# Patient Record
Sex: FEMALE | Race: WHITE | NOT HISPANIC OR LATINO | URBAN - METROPOLITAN AREA
[De-identification: names, ages, dates, MRNs, and addresses within clinical notes are randomized per-mention and may not be internally consistent; named-entity substitution may affect disease eponyms.]

---

## 2017-06-16 ENCOUNTER — OUTPATIENT (OUTPATIENT)
Dept: OUTPATIENT SERVICES | Facility: HOSPITAL | Age: 77
LOS: 1 days | End: 2017-06-16

## 2017-08-02 ENCOUNTER — OUTPATIENT (OUTPATIENT)
Dept: OUTPATIENT SERVICES | Facility: HOSPITAL | Age: 77
LOS: 1 days | End: 2017-08-02

## 2017-09-15 ENCOUNTER — OUTPATIENT (OUTPATIENT)
Dept: OUTPATIENT SERVICES | Facility: HOSPITAL | Age: 77
LOS: 1 days | End: 2017-09-15

## 2017-10-23 ENCOUNTER — APPOINTMENT (OUTPATIENT)
Dept: ORTHOPEDIC SURGERY | Facility: CLINIC | Age: 77
End: 2017-10-23
Payer: MEDICARE

## 2017-10-23 VITALS
HEART RATE: 58 BPM | DIASTOLIC BLOOD PRESSURE: 73 MMHG | BODY MASS INDEX: 32.49 KG/M2 | SYSTOLIC BLOOD PRESSURE: 128 MMHG | WEIGHT: 195 LBS | HEIGHT: 65 IN

## 2017-10-23 DIAGNOSIS — Z87.39 PERSONAL HISTORY OF OTHER DISEASES OF THE MUSCULOSKELETAL SYSTEM AND CONNECTIVE TISSUE: ICD-10-CM

## 2017-10-23 DIAGNOSIS — M19.042 PRIMARY OSTEOARTHRITIS, LEFT HAND: ICD-10-CM

## 2017-10-23 DIAGNOSIS — M19.041 PRIMARY OSTEOARTHRITIS, RIGHT HAND: ICD-10-CM

## 2017-10-23 DIAGNOSIS — Z82.61 FAMILY HISTORY OF ARTHRITIS: ICD-10-CM

## 2017-10-23 DIAGNOSIS — Z82.62 FAMILY HISTORY OF OSTEOPOROSIS: ICD-10-CM

## 2017-10-23 DIAGNOSIS — Z86.79 PERSONAL HISTORY OF OTHER DISEASES OF THE CIRCULATORY SYSTEM: ICD-10-CM

## 2017-10-23 PROCEDURE — 99203 OFFICE O/P NEW LOW 30 MIN: CPT

## 2017-10-23 PROCEDURE — 73130 X-RAY EXAM OF HAND: CPT | Mod: 26,LT

## 2017-10-23 RX ORDER — TRAZODONE HYDROCHLORIDE 50 MG/1
50 TABLET ORAL
Refills: 0 | Status: ACTIVE | COMMUNITY

## 2017-10-23 RX ORDER — ALPRAZOLAM 0.25 MG/1
0.25 TABLET ORAL
Refills: 0 | Status: ACTIVE | COMMUNITY

## 2017-10-23 RX ORDER — ASPIRIN 81 MG/1
81 TABLET, CHEWABLE ORAL
Refills: 0 | Status: ACTIVE | COMMUNITY

## 2017-10-23 RX ORDER — PROPRANOLOL HYDROCHLORIDE 40 MG/1
40 TABLET ORAL
Refills: 0 | Status: ACTIVE | COMMUNITY

## 2017-12-12 ENCOUNTER — OUTPATIENT (OUTPATIENT)
Dept: OUTPATIENT SERVICES | Facility: HOSPITAL | Age: 77
LOS: 1 days | End: 2017-12-12

## 2018-02-28 ENCOUNTER — OUTPATIENT (OUTPATIENT)
Dept: OUTPATIENT SERVICES | Facility: HOSPITAL | Age: 78
LOS: 1 days | End: 2018-02-28

## 2018-03-02 ENCOUNTER — MEDICATION RENEWAL (OUTPATIENT)
Age: 78
End: 2018-03-02

## 2018-03-02 ENCOUNTER — APPOINTMENT (OUTPATIENT)
Dept: UROLOGY | Facility: CLINIC | Age: 78
End: 2018-03-02
Payer: MEDICARE

## 2018-03-02 VITALS — DIASTOLIC BLOOD PRESSURE: 72 MMHG | TEMPERATURE: 97.9 F | HEART RATE: 56 BPM | SYSTOLIC BLOOD PRESSURE: 114 MMHG

## 2018-03-02 PROCEDURE — 99214 OFFICE O/P EST MOD 30 MIN: CPT

## 2018-03-10 ENCOUNTER — APPOINTMENT (OUTPATIENT)
Dept: UROLOGY | Facility: CLINIC | Age: 78
End: 2018-03-10

## 2018-03-27 ENCOUNTER — TRANSCRIPTION ENCOUNTER (OUTPATIENT)
Age: 78
End: 2018-03-27

## 2018-04-18 ENCOUNTER — OUTPATIENT (OUTPATIENT)
Dept: OUTPATIENT SERVICES | Facility: HOSPITAL | Age: 78
LOS: 1 days | End: 2018-04-18

## 2019-01-23 ENCOUNTER — OUTPATIENT (OUTPATIENT)
Dept: OUTPATIENT SERVICES | Facility: HOSPITAL | Age: 79
LOS: 1 days | End: 2019-01-23

## 2019-04-15 ENCOUNTER — OUTPATIENT (OUTPATIENT)
Dept: OUTPATIENT SERVICES | Facility: HOSPITAL | Age: 79
LOS: 1 days | End: 2019-04-15

## 2019-06-11 ENCOUNTER — OUTPATIENT (OUTPATIENT)
Dept: OUTPATIENT SERVICES | Facility: HOSPITAL | Age: 79
LOS: 1 days | End: 2019-06-11

## 2019-07-12 ENCOUNTER — APPOINTMENT (OUTPATIENT)
Dept: UROGYNECOLOGY | Facility: CLINIC | Age: 79
End: 2019-07-12
Payer: MEDICARE

## 2019-07-12 VITALS
DIASTOLIC BLOOD PRESSURE: 70 MMHG | SYSTOLIC BLOOD PRESSURE: 128 MMHG | HEIGHT: 65 IN | BODY MASS INDEX: 31.32 KG/M2 | WEIGHT: 188 LBS

## 2019-07-12 DIAGNOSIS — E07.9 DISORDER OF THYROID, UNSPECIFIED: ICD-10-CM

## 2019-07-12 DIAGNOSIS — K59.00 CONSTIPATION, UNSPECIFIED: ICD-10-CM

## 2019-07-12 DIAGNOSIS — Z86.59 PERSONAL HISTORY OF OTHER MENTAL AND BEHAVIORAL DISORDERS: ICD-10-CM

## 2019-07-12 DIAGNOSIS — Z83.3 FAMILY HISTORY OF DIABETES MELLITUS: ICD-10-CM

## 2019-07-12 DIAGNOSIS — Z82.49 FAMILY HISTORY OF ISCHEMIC HEART DISEASE AND OTHER DISEASES OF THE CIRCULATORY SYSTEM: ICD-10-CM

## 2019-07-12 DIAGNOSIS — Z86.711 PERSONAL HISTORY OF PULMONARY EMBOLISM: ICD-10-CM

## 2019-07-12 DIAGNOSIS — Z86.718 PERSONAL HISTORY OF OTHER VENOUS THROMBOSIS AND EMBOLISM: ICD-10-CM

## 2019-07-12 DIAGNOSIS — Z82.5 FAMILY HISTORY OF ASTHMA AND OTHER CHRONIC LOWER RESPIRATORY DISEASES: ICD-10-CM

## 2019-07-12 LAB
BILIRUB UR QL STRIP: NEGATIVE
CLARITY UR: CLEAR
COLLECTION METHOD: NORMAL
GLUCOSE UR-MCNC: NEGATIVE
HCG UR QL: 0.2 EU/DL
HGB UR QL STRIP.AUTO: NORMAL
KETONES UR-MCNC: NEGATIVE
LEUKOCYTE ESTERASE UR QL STRIP: NORMAL
NITRITE UR QL STRIP: NEGATIVE
PH UR STRIP: 6
PROT UR STRIP-MCNC: NEGATIVE
SP GR UR STRIP: 1.01

## 2019-07-12 PROCEDURE — 51701 INSERT BLADDER CATHETER: CPT

## 2019-07-12 PROCEDURE — 81003 URINALYSIS AUTO W/O SCOPE: CPT | Mod: QW

## 2019-07-12 PROCEDURE — 99204 OFFICE O/P NEW MOD 45 MIN: CPT | Mod: 25

## 2019-07-12 RX ORDER — ESTRADIOL 0.1 MG/G
0.1 CREAM VAGINAL
Qty: 1 | Refills: 2 | Status: DISCONTINUED | COMMUNITY
Start: 2018-03-02 | End: 2019-07-12

## 2019-07-12 NOTE — HISTORY OF PRESENT ILLNESS
[FreeTextEntry1] : \par 77 y/o presents s/p uphold and TOT sling by Dr. Clark 11/2018, she had to stay overnight and catheter removed pod#1. She reports minor bulge prior to surgery. reports was on tylenol with codeine for 3 wks,   and reports UTIs and persistent OAB  since surgery, was told she needs to be on antibiotic for rest of her life, she reports UTIs and FAHEEM prior to surgery, she had a reaction to bactrim during treatment. She reports many years of UTIs and FAHEEM, she is a retired nurse. she reports leakage of stool that also she noticed after surgery, she reports fecal urgency, she reports currently her most bothersome symptoms is UTI, reports 1 UTI a month, reports does not get urine checked anytime, has not tried estrace or dmannose, does not take cranberry tablets, does not take Azo, does not feel like she as an infection today, reports symptoms include pain and odor, reports daytime frequency at least every 2 hours, nocturia X 3, minimal leakage, denies hematuria, denies vaginal bleeding, reports incomplete emptying, occasional intermittent and slow stream, denies constipations, not sexually active, has tried myrbetriq with no improvement\par \par daily intake: 10-12 glasses/day, drinks a lot tea, \par \par she reports need to [facility] after knee surgery 10 yrs ago\par PSH: vaginal hysterectomy for prolapse

## 2019-07-12 NOTE — OB HISTORY
[Vaginal ___] : [unfilled] vaginal delivery(s) [ ___] : [unfilled]  section delivery(s) [Regular Cycle Intervals] : periods have been irregular [Sexually Active] : is not sexually active

## 2019-07-12 NOTE — DISCUSSION/SUMMARY
[FreeTextEntry1] : \par Cynthia presents s/p uphold and TOT sling in 11/2018 and presents with c/o urgency, frequency and most bothersome recurrent UTI. On exam no mesh exposure, no prolapse,  normal PVR. She does not have culture results available. We discussed etiology and management of recurrent urinary tract infections. We discussed behavioral modifications including increased oral intake, cranberry tablets, wiping front to back, d mannose. We discussed management of recurrent UTIs with vaginal estrogen and antibiotic prophylaxis. She does is not interested in vaginal estrogen and discussed nonhormonal vaginal lubricants.   We discussed that is she is symptomatic I prefer for catheterized specimen however if she cannot come to the office, discussed u/a and culture prior to antibiotic treatment. We discussed methenamine ppx with vit C. We discussed cystoscopy due to h/o of mesh. Regarding overactive bladder, We reviewed her symptoms and exam findings. We discussed management options for overactive bladder including observation, behavorial modifications and bladder training, physical therapy and medications including anticholinergics and beta 3 agonists. We discussed if behavorial modifications and medications fail proceeding with urodynamics to further evaluate her symptoms. We discussed additional treatment options including sacral neuromodulation, PTNS and intra detrusor Botox. IUGA handout on overactive bladder and bladder training was given to her. We discussed trial of PTNS given failed myrbetriq before and not interested in anticholingerics due to risk of dementia. All questions were answered.\par \par [] u/a, culture\par [] cystoscopy\par [] methenamine ppx\par [] PTNS/BT\par \par \par

## 2019-07-12 NOTE — PHYSICAL EXAM
[Well developed] : well developed [No Acute Distress] : in no acute distress [Well Nourished] : ~L well nourished [Normal Memory] : ~T memory was ~L unimpaired [Oriented x3] : oriented to person, place, and time [Normal Mood/Affect] : mood and affect are normal [Cough] : no cough [Tenderness] : ~T no ~M abdominal tenderness observed [No Edema] : ~T edema was not present [Inguinal LAD] : no adenopathy was noted in the inguinal lymph nodes [Distended] : not distended [Normal Gait] : gait was normal [Warm and Dry] : was warm and dry to touch [Labia Majora] : were normal [Labia Minora] : were normal [Normal Appearance] : general appearance was normal [Normal] : was normal [3] : 3 [Absent] : absent [Soft] :  the cervix was soft [Post Void Residual ____ml] : post void residual via catheterization was [unfilled] ml [de-identified] : no prolapse noted [FreeTextEntry4] : no mesh exposure, palpable banding and TOT sling palpable

## 2019-07-15 ENCOUNTER — RESULT REVIEW (OUTPATIENT)
Age: 79
End: 2019-07-15

## 2019-07-15 LAB
APPEARANCE: CLEAR
BACTERIA: NEGATIVE
BILIRUBIN URINE: NEGATIVE
BLOOD URINE: NEGATIVE
COLOR: NORMAL
GLUCOSE QUALITATIVE U: NEGATIVE
HYALINE CASTS: 0 /LPF
KETONES URINE: NEGATIVE
LEUKOCYTE ESTERASE URINE: NEGATIVE
MICROSCOPIC-UA: NORMAL
NITRITE URINE: NEGATIVE
PH URINE: 6.5
PROTEIN URINE: NEGATIVE
RED BLOOD CELLS URINE: 0 /HPF
SPECIFIC GRAVITY URINE: 1.01
SQUAMOUS EPITHELIAL CELLS: 0 /HPF
UROBILINOGEN URINE: NORMAL
WHITE BLOOD CELLS URINE: 1 /HPF

## 2019-07-16 ENCOUNTER — TRANSCRIPTION ENCOUNTER (OUTPATIENT)
Age: 79
End: 2019-07-16

## 2019-07-16 LAB — BACTERIA UR CULT: ABNORMAL

## 2019-07-22 ENCOUNTER — RESULT CHARGE (OUTPATIENT)
Age: 79
End: 2019-07-22

## 2019-07-22 ENCOUNTER — APPOINTMENT (OUTPATIENT)
Dept: UROGYNECOLOGY | Facility: CLINIC | Age: 79
End: 2019-07-22
Payer: MEDICARE

## 2019-07-22 VITALS
BODY MASS INDEX: 31.16 KG/M2 | WEIGHT: 187 LBS | HEIGHT: 65 IN | DIASTOLIC BLOOD PRESSURE: 70 MMHG | SYSTOLIC BLOOD PRESSURE: 120 MMHG

## 2019-07-22 LAB
BILIRUB UR QL STRIP: NEGATIVE
CLARITY UR: CLEAR
COLLECTION METHOD: NORMAL
GLUCOSE UR-MCNC: NEGATIVE
HCG UR QL: 0.2 EU/DL
HGB UR QL STRIP.AUTO: NORMAL
KETONES UR-MCNC: NEGATIVE
LEUKOCYTE ESTERASE UR QL STRIP: NEGATIVE
NITRITE UR QL STRIP: NEGATIVE
PH UR STRIP: 5
PROT UR STRIP-MCNC: NEGATIVE
SP GR UR STRIP: 1.02

## 2019-07-22 PROCEDURE — 81003 URINALYSIS AUTO W/O SCOPE: CPT | Mod: QW

## 2019-07-22 PROCEDURE — 52000 CYSTOURETHROSCOPY: CPT

## 2019-07-22 PROCEDURE — 99213 OFFICE O/P EST LOW 20 MIN: CPT | Mod: 25

## 2019-07-22 RX ORDER — METHENAMINE HIPPURATE 1 G/1
1 TABLET ORAL TWICE DAILY
Qty: 180 | Refills: 3 | Status: DISCONTINUED | COMMUNITY
Start: 2019-07-12 | End: 2019-07-22

## 2019-07-22 NOTE — HISTORY OF PRESENT ILLNESS
[FreeTextEntry1] : \par Cynthia presents for followup on OAB and recurrent UTI, she did not start PTNS because she is watching her grandson. She took course of keflex. she was unable to get methenamine due to cost.\par \par cystoscopy today negative\par \par s/p uphold and TOT sling 11/2018

## 2019-07-22 NOTE — DISCUSSION/SUMMARY
[FreeTextEntry1] : \par Cynthia presents for followup on OAB and recurrent UTI. She has no symptoms of UTI today, cystoscopy negative. She was unable to get methenamine, keflex given for ppx, advised to take probiotics. Renal us ordered for recurrent uti. She will return for PTNS. All questions \par \par [] keflex UTI ppx\par [] renal us

## 2019-07-23 ENCOUNTER — RESULT REVIEW (OUTPATIENT)
Age: 79
End: 2019-07-23

## 2019-07-23 LAB
APPEARANCE: CLEAR
BACTERIA: NEGATIVE
BILIRUBIN URINE: NEGATIVE
BLOOD URINE: NORMAL
COLOR: YELLOW
GLUCOSE QUALITATIVE U: NEGATIVE
HYALINE CASTS: 1 /LPF
KETONES URINE: NEGATIVE
LEUKOCYTE ESTERASE URINE: NEGATIVE
MICROSCOPIC-UA: NORMAL
NITRITE URINE: NEGATIVE
PH URINE: 5.5
PROTEIN URINE: NORMAL
RED BLOOD CELLS URINE: 2 /HPF
SPECIFIC GRAVITY URINE: 1.02
SQUAMOUS EPITHELIAL CELLS: 1 /HPF
UROBILINOGEN URINE: NORMAL
WHITE BLOOD CELLS URINE: 0 /HPF

## 2019-07-24 ENCOUNTER — RESULT REVIEW (OUTPATIENT)
Age: 79
End: 2019-07-24

## 2019-07-25 ENCOUNTER — RESULT REVIEW (OUTPATIENT)
Age: 79
End: 2019-07-25

## 2019-07-25 LAB — BACTERIA UR CULT: NORMAL

## 2019-07-31 ENCOUNTER — APPOINTMENT (OUTPATIENT)
Dept: UROGYNECOLOGY | Facility: CLINIC | Age: 79
End: 2019-07-31

## 2019-08-07 ENCOUNTER — OUTPATIENT (OUTPATIENT)
Dept: OUTPATIENT SERVICES | Facility: HOSPITAL | Age: 79
LOS: 1 days | End: 2019-08-07
Payer: MEDICARE

## 2019-08-07 PROCEDURE — 76770 US EXAM ABDO BACK WALL COMP: CPT | Mod: 26

## 2019-08-07 PROCEDURE — 76856 US EXAM PELVIC COMPLETE: CPT | Mod: 26

## 2019-09-16 ENCOUNTER — OUTPATIENT (OUTPATIENT)
Dept: OUTPATIENT SERVICES | Facility: HOSPITAL | Age: 79
LOS: 1 days | End: 2019-09-16

## 2019-11-14 ENCOUNTER — OUTPATIENT (OUTPATIENT)
Dept: OUTPATIENT SERVICES | Facility: HOSPITAL | Age: 79
LOS: 1 days | End: 2019-11-14

## 2019-12-17 ENCOUNTER — OUTPATIENT (OUTPATIENT)
Dept: OUTPATIENT SERVICES | Facility: HOSPITAL | Age: 79
LOS: 1 days | End: 2019-12-17

## 2020-02-24 ENCOUNTER — APPOINTMENT (OUTPATIENT)
Age: 80
End: 2020-02-24
Payer: MEDICARE

## 2020-02-24 VITALS — TEMPERATURE: 97.7 F | DIASTOLIC BLOOD PRESSURE: 58 MMHG | SYSTOLIC BLOOD PRESSURE: 102 MMHG

## 2020-02-24 DIAGNOSIS — R15.9 FULL INCONTINENCE OF FECES: ICD-10-CM

## 2020-02-24 PROCEDURE — 99214 OFFICE O/P EST MOD 30 MIN: CPT

## 2020-02-24 NOTE — PHYSICAL EXAM
[Normal] : no abnormalities [Post Void Residual ____ml] : post void residual via catheterization was [unfilled] ml

## 2020-02-24 NOTE — HISTORY OF PRESENT ILLNESS
[FreeTextEntry1] : \par Cynthia presents for followup on OAB and recurrent UTI, currently OAB symptoms not bothersome. Regarding UTIs, she reports 1 UTI since july, she stopped keflex ppx 1 month ago and reports burning with urination and odor, estrogen is too expensive, she does report following with GI regarding FI.\par \par \par \par s/p uphold and TOT sling 11/2018, cystoscopy negative, renal sonogram with cyst

## 2020-02-24 NOTE — DISCUSSION/SUMMARY
[FreeTextEntry1] : \par Cynthia presents for followup on  recurrent UTI, OAB not bothersome. Today feels like she has a UTI, macrobid sent empirically, u/a and culture sent. F/u with GI re: FI.\par \par [] keflex UTI ppx\par []  f/u 6 months or sooner

## 2020-02-25 LAB
APPEARANCE: CLEAR
BACTERIA: ABNORMAL
BILIRUBIN URINE: NEGATIVE
BLOOD URINE: NEGATIVE
COLOR: COLORLESS
GLUCOSE QUALITATIVE U: NEGATIVE
HYALINE CASTS: 0 /LPF
KETONES URINE: NEGATIVE
LEUKOCYTE ESTERASE URINE: ABNORMAL
MICROSCOPIC-UA: NORMAL
NITRITE URINE: POSITIVE
PH URINE: 6
PROTEIN URINE: NEGATIVE
RED BLOOD CELLS URINE: 1 /HPF
SPECIFIC GRAVITY URINE: 1.01
SQUAMOUS EPITHELIAL CELLS: 2 /HPF
UROBILINOGEN URINE: NORMAL
WHITE BLOOD CELLS URINE: 3 /HPF

## 2020-02-27 LAB — BACTERIA UR CULT: ABNORMAL

## 2020-03-03 ENCOUNTER — OUTPATIENT (OUTPATIENT)
Dept: OUTPATIENT SERVICES | Facility: HOSPITAL | Age: 80
LOS: 1 days | End: 2020-03-03

## 2020-05-27 ENCOUNTER — RX RENEWAL (OUTPATIENT)
Age: 80
End: 2020-05-27

## 2020-06-04 ENCOUNTER — OUTPATIENT (OUTPATIENT)
Dept: OUTPATIENT SERVICES | Facility: HOSPITAL | Age: 80
LOS: 1 days | End: 2020-06-04

## 2020-06-29 ENCOUNTER — RX RENEWAL (OUTPATIENT)
Age: 80
End: 2020-06-29

## 2020-08-03 ENCOUNTER — RX RENEWAL (OUTPATIENT)
Age: 80
End: 2020-08-03

## 2020-08-07 ENCOUNTER — APPOINTMENT (OUTPATIENT)
Age: 80
End: 2020-08-07

## 2020-08-23 ENCOUNTER — TRANSCRIPTION ENCOUNTER (OUTPATIENT)
Age: 80
End: 2020-08-23

## 2020-09-03 RX ORDER — CEPHALEXIN 500 MG/1
500 TABLET ORAL TWICE DAILY
Qty: 10 | Refills: 0 | Status: DISCONTINUED | COMMUNITY
Start: 2020-09-03 | End: 2020-09-03

## 2020-09-11 ENCOUNTER — APPOINTMENT (OUTPATIENT)
Age: 80
End: 2020-09-11
Payer: MEDICARE

## 2020-09-11 ENCOUNTER — RESULT CHARGE (OUTPATIENT)
Age: 80
End: 2020-09-11

## 2020-09-11 VITALS — DIASTOLIC BLOOD PRESSURE: 80 MMHG | SYSTOLIC BLOOD PRESSURE: 120 MMHG

## 2020-09-11 DIAGNOSIS — R33.9 RETENTION OF URINE, UNSPECIFIED: ICD-10-CM

## 2020-09-11 LAB
BILIRUB UR QL STRIP: NEGATIVE
CLARITY UR: CLEAR
COLLECTION METHOD: NORMAL
GLUCOSE UR-MCNC: NEGATIVE
HCG UR QL: 0.2 EU/DL
HGB UR QL STRIP.AUTO: NORMAL
KETONES UR-MCNC: NEGATIVE
LEUKOCYTE ESTERASE UR QL STRIP: NORMAL
NITRITE UR QL STRIP: NEGATIVE
PH UR STRIP: 6
PROT UR STRIP-MCNC: NEGATIVE
SP GR UR STRIP: 1

## 2020-09-11 PROCEDURE — 51701 INSERT BLADDER CATHETER: CPT

## 2020-09-11 PROCEDURE — 99214 OFFICE O/P EST MOD 30 MIN: CPT | Mod: 25

## 2020-09-11 NOTE — PHYSICAL EXAM
[No Acute Distress] : in no acute distress [Well developed] : well developed [Well Nourished] : ~L well nourished [Oriented x3] : oriented to person, place, and time [Normal Memory] : ~T memory was ~L unimpaired [Cough] : no cough [Normal Mood/Affect] : mood and affect are normal [H/Smegaly] : no hepatosplenomegaly [Distended] : not distended [Warm and Dry] : was warm and dry to touch [Inguinal LAD] : no adenopathy was noted in the inguinal lymph nodes [Normal Gait] : gait was normal [Labia Majora] : were normal [Labia Minora] : were normal [Normal Appearance] : general appearance was normal [Normal] : no abnormalities [Post Void Residual ____ml] : post void residual was [unfilled] ml

## 2020-09-11 NOTE — DISCUSSION/SUMMARY
[FreeTextEntry1] : \par Cynthia presents for followup on  recurrent UTI, OAB not bothersome. Today with UTI despite treatment with macrobid and keflex, discussed treating with cipro and restarting keflex ppx. all questions were answered. \par \par [] cipro for UTI treatment (u/a and culture sent)\par [] keflex UTI ppx\par []  f/u 6 months or sooner

## 2020-09-11 NOTE — HISTORY OF PRESENT ILLNESS
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 Regarding UTIs, she reports 1 UTI since seen last however she was seen in urgent care with klebsiella UTI starting on macrobid switched to keflex  and continues to have no relief in symptoms, reports frequency/urgency/dysuria. she ran out of keflex so stopped taking it ppx\par \par \par s/p uphold and TOT sling 11/2018, cystoscopy negative, renal sonogram with cyst

## 2020-09-14 LAB
APPEARANCE: CLEAR
BACTERIA UR CULT: ABNORMAL
BACTERIA: NEGATIVE
BILIRUBIN URINE: NEGATIVE
BLOOD URINE: NEGATIVE
COLOR: COLORLESS
GLUCOSE QUALITATIVE U: NEGATIVE
HYALINE CASTS: 0 /LPF
KETONES URINE: NEGATIVE
LEUKOCYTE ESTERASE URINE: ABNORMAL
MICROSCOPIC-UA: NORMAL
NITRITE URINE: NEGATIVE
PH URINE: 7
PROTEIN URINE: NEGATIVE
RED BLOOD CELLS URINE: 0 /HPF
SPECIFIC GRAVITY URINE: 1
SQUAMOUS EPITHELIAL CELLS: 0 /HPF
UROBILINOGEN URINE: NORMAL
WHITE BLOOD CELLS URINE: 19 /HPF

## 2020-09-15 ENCOUNTER — OUTPATIENT (OUTPATIENT)
Dept: OUTPATIENT SERVICES | Facility: HOSPITAL | Age: 80
LOS: 1 days | End: 2020-09-15

## 2020-09-21 ENCOUNTER — RESULT CHARGE (OUTPATIENT)
Age: 80
End: 2020-09-21

## 2020-09-21 ENCOUNTER — APPOINTMENT (OUTPATIENT)
Age: 80
End: 2020-09-21
Payer: MEDICARE

## 2020-09-21 VITALS — DIASTOLIC BLOOD PRESSURE: 70 MMHG | SYSTOLIC BLOOD PRESSURE: 138 MMHG

## 2020-09-21 LAB
BILIRUB UR QL STRIP: NEGATIVE
CLARITY UR: NORMAL
COLLECTION METHOD: NORMAL
GLUCOSE UR-MCNC: NEGATIVE
HCG UR QL: 0.2 EU/DL
HGB UR QL STRIP.AUTO: NORMAL
KETONES UR-MCNC: NEGATIVE
LEUKOCYTE ESTERASE UR QL STRIP: NORMAL
NITRITE UR QL STRIP: POSITIVE
PH UR STRIP: 5.5
PROT UR STRIP-MCNC: NEGATIVE
SP GR UR STRIP: 1.02

## 2020-09-21 PROCEDURE — 99214 OFFICE O/P EST MOD 30 MIN: CPT

## 2020-09-21 NOTE — DISCUSSION/SUMMARY
[FreeTextEntry1] : \par Cynthia presents for followup on  recurrent UTI, OAB not bothersome. Today with UTI despite treatment with macrobid and keflex and cipro, discussed renal sonogram, treatment with cefuroxime 500mg BID\par \par [] cefuroxime for UTI treatment (u/a and culture sent)\par [] keflex UTI ppx after UTI treatment \par [] renal/bladder  sonogram \par []  f/u 10 days for test of cure

## 2020-09-21 NOTE — HISTORY OF PRESENT ILLNESS
[Cystocele (Obstetric)] : mild [Vaginal Wall Prolapse] : no [Rectal Prolapse] : no [Unable To Restrain Bowel Movement] : mild [Feelings Of Urinary Urgency] : moderate [x3+] : nocturia three or more  times a night [Incomplete Emptying Of Bladder] : no [Urinary Frequency] : no [Pain During Urination (Dysuria)] : moderate [Urinary Tract Infection] : moderate [Constipation Obstructed Defecation] : mild [] : no [Incomplete Emptying Of Stool] : no [Pelvic Pain] : mild [Vaginal Pain] : no [FreeTextEntry1] : \par Cynthia presents for followup on OAB and recurrent UTI, currently OAB symptoms not bothersome. Regarding UTIs, she reports 1 UTI since seen last however she was seen in urgent care with klebsiella UTI starting on macrobid switched to keflex  and continued to have no relief in symptoms, was given cipro last visit and urine cx with >100k gram neg rods, she continues to not have a response to medication, advised to get renal sonogram but has not yet, reports continued dysuria, frequency, no fevers/chills, no nausea/emesis \par \par \par s/p uphold and TOT sling 11/2018, cystoscopy negative, renal sonogram with cyst

## 2020-09-21 NOTE — PHYSICAL EXAM
[Chaperone Present] : A chaperone was present in the examining room during all aspects of the physical examination [No Acute Distress] : in no acute distress [Well developed] : well developed [Well Nourished] : ~L well nourished [Oriented x3] : oriented to person, place, and time [Normal Memory] : ~T memory was ~L unimpaired [Normal Mood/Affect] : mood and affect are normal [Cough] : no cough [Distended] : not distended [H/Smegaly] : no hepatosplenomegaly [None] : no CVA tenderness [Inguinal LAD] : no adenopathy was noted in the inguinal lymph nodes [Warm and Dry] : was warm and dry to touch [Normal Gait] : gait was normal [Labia Majora] : were normal [Labia Minora] : were normal [Normal Appearance] : general appearance was normal [Normal] : no abnormalities [Post Void Residual ____ml] : post void residual was [unfilled] ml

## 2020-09-22 ENCOUNTER — OUTPATIENT (OUTPATIENT)
Dept: OUTPATIENT SERVICES | Facility: HOSPITAL | Age: 80
LOS: 1 days | End: 2020-09-22
Payer: MEDICARE

## 2020-09-22 LAB
APPEARANCE: CLEAR
BACTERIA: ABNORMAL
BILIRUBIN URINE: NEGATIVE
BLOOD URINE: NEGATIVE
COLOR: YELLOW
GLUCOSE QUALITATIVE U: NEGATIVE
HYALINE CASTS: 1 /LPF
KETONES URINE: NEGATIVE
LEUKOCYTE ESTERASE URINE: ABNORMAL
MICROSCOPIC-UA: NORMAL
NITRITE URINE: NEGATIVE
PH URINE: 6
PROTEIN URINE: NORMAL
RED BLOOD CELLS URINE: 2 /HPF
SPECIFIC GRAVITY URINE: 1.02
SQUAMOUS EPITHELIAL CELLS: 0 /HPF
UROBILINOGEN URINE: NORMAL
WHITE BLOOD CELLS URINE: 57 /HPF

## 2020-09-22 PROCEDURE — 76856 US EXAM PELVIC COMPLETE: CPT | Mod: 26

## 2020-09-22 PROCEDURE — 76770 US EXAM ABDO BACK WALL COMP: CPT | Mod: 26

## 2020-09-24 LAB — BACTERIA UR CULT: ABNORMAL

## 2020-10-02 ENCOUNTER — RESULT CHARGE (OUTPATIENT)
Age: 80
End: 2020-10-02

## 2020-10-02 ENCOUNTER — APPOINTMENT (OUTPATIENT)
Age: 80
End: 2020-10-02
Payer: MEDICARE

## 2020-10-02 LAB
BILIRUB UR QL STRIP: NEGATIVE
CLARITY UR: CLEAR
COLLECTION METHOD: NORMAL
GLUCOSE UR-MCNC: NEGATIVE
HCG UR QL: 0.2 EU/DL
HGB UR QL STRIP.AUTO: NORMAL
KETONES UR-MCNC: NEGATIVE
LEUKOCYTE ESTERASE UR QL STRIP: NEGATIVE
NITRITE UR QL STRIP: NEGATIVE
PH UR STRIP: 6
SP GR UR STRIP: 1.01

## 2020-10-02 PROCEDURE — 99213 OFFICE O/P EST LOW 20 MIN: CPT

## 2020-10-02 NOTE — HISTORY OF PRESENT ILLNESS
[Cystocele (Obstetric)] : mild [Vaginal Wall Prolapse] : no [Rectal Prolapse] : no [Unable To Restrain Bowel Movement] : mild [Feelings Of Urinary Urgency] : moderate [x3+] : nocturia three or more  times a night [Incomplete Emptying Of Bladder] : no [Urinary Frequency] : no [Pain During Urination (Dysuria)] : no [Urinary Tract Infection] : moderate [Constipation Obstructed Defecation] : mild [] : no [Incomplete Emptying Of Stool] : no [Pelvic Pain] : mild [Vaginal Pain] : no [FreeTextEntry1] : \par Cynthia presents for followup on OAB and recurrent UTI, currently OAB symptoms not bothersome. Regarding UTIs, she reports 1 UTI s however she was seen in urgent care with klebsiella UTI starting on macrobid switched to keflex  and continued to have no relief in symptoms, was given cipro  and urine cx with >100k gram neg rods, she continued to have no relief so ceftin was given, she now has resolution of her symptoms, dysuria resolved and is much happier. \par \par \par s/p uphold and TOT sling 11/2018, cystoscopy negative, \par recent sonogram with no stone/hydro

## 2020-10-02 NOTE — PHYSICAL EXAM
[Chaperone Present] : A chaperone was present in the examining room during all aspects of the physical examination [No Acute Distress] : in no acute distress [Well developed] : well developed [Well Nourished] : ~L well nourished [Oriented x3] : oriented to person, place, and time [Normal Memory] : ~T memory was ~L unimpaired [Normal Mood/Affect] : mood and affect are normal [Cough] : no cough [Inguinal LAD] : no adenopathy was noted in the inguinal lymph nodes [Warm and Dry] : was warm and dry to touch [Normal Gait] : gait was normal [Labia Majora] : were normal [Labia Minora] : were normal [Normal Appearance] : general appearance was normal [Normal] : no abnormalities [Post Void Residual ____ml] : post void residual was [unfilled] ml

## 2020-10-02 NOTE — DISCUSSION/SUMMARY
[FreeTextEntry1] : \par Cynthia presents for followup on  recurrent UTI, OAB not bothersome. UTI now resolved with symptoms resolved and negative renal sonogram. will send u/a and culture to ensure no growth and restart keflex ppx. All questions were answered. \par \par [] keflex UTI ppx \par []  f/u 6  months

## 2020-10-05 LAB
APPEARANCE: CLEAR
BACTERIA: NEGATIVE
BILIRUBIN URINE: NEGATIVE
BLOOD URINE: NEGATIVE
COLOR: NORMAL
GLUCOSE QUALITATIVE U: NEGATIVE
HYALINE CASTS: 0 /LPF
KETONES URINE: NEGATIVE
LEUKOCYTE ESTERASE URINE: NEGATIVE
MICROSCOPIC-UA: NORMAL
NITRITE URINE: NEGATIVE
PH URINE: 6
PROTEIN URINE: NEGATIVE
RED BLOOD CELLS URINE: 1 /HPF
SPECIFIC GRAVITY URINE: 1.01
SQUAMOUS EPITHELIAL CELLS: 0 /HPF
UROBILINOGEN URINE: NORMAL
WHITE BLOOD CELLS URINE: 0 /HPF

## 2020-10-27 ENCOUNTER — OUTPATIENT (OUTPATIENT)
Dept: OUTPATIENT SERVICES | Facility: HOSPITAL | Age: 80
LOS: 1 days | End: 2020-10-27
Payer: MEDICARE

## 2020-10-27 PROCEDURE — 73630 X-RAY EXAM OF FOOT: CPT | Mod: 26,LT,76

## 2020-10-27 PROCEDURE — 73610 X-RAY EXAM OF ANKLE: CPT | Mod: 26,RT

## 2020-10-27 PROCEDURE — 73130 X-RAY EXAM OF HAND: CPT | Mod: 26,LT,76

## 2020-10-27 PROCEDURE — 73110 X-RAY EXAM OF WRIST: CPT | Mod: 26,RT

## 2020-12-22 ENCOUNTER — OUTPATIENT (OUTPATIENT)
Dept: OUTPATIENT SERVICES | Facility: HOSPITAL | Age: 80
LOS: 1 days | End: 2020-12-22

## 2021-03-25 ENCOUNTER — OUTPATIENT (OUTPATIENT)
Dept: OUTPATIENT SERVICES | Facility: HOSPITAL | Age: 81
LOS: 1 days | End: 2021-03-25

## 2021-03-29 ENCOUNTER — RX RENEWAL (OUTPATIENT)
Age: 81
End: 2021-03-29

## 2021-04-02 ENCOUNTER — APPOINTMENT (OUTPATIENT)
Age: 81
End: 2021-04-02
Payer: MEDICARE

## 2021-04-02 ENCOUNTER — RESULT CHARGE (OUTPATIENT)
Age: 81
End: 2021-04-02

## 2021-04-02 VITALS — TEMPERATURE: 98 F | SYSTOLIC BLOOD PRESSURE: 122 MMHG | DIASTOLIC BLOOD PRESSURE: 80 MMHG

## 2021-04-02 LAB
BILIRUB UR QL STRIP: NEGATIVE
CLARITY UR: CLEAR
COLLECTION METHOD: NORMAL
GLUCOSE UR-MCNC: NEGATIVE
HCG UR QL: 0.2 EU/DL
HGB UR QL STRIP.AUTO: NORMAL
KETONES UR-MCNC: NEGATIVE
LEUKOCYTE ESTERASE UR QL STRIP: NEGATIVE
NITRITE UR QL STRIP: NEGATIVE
PH UR STRIP: 7
PROT UR STRIP-MCNC: NEGATIVE
SP GR UR STRIP: 1.01

## 2021-04-02 PROCEDURE — 99213 OFFICE O/P EST LOW 20 MIN: CPT | Mod: 25

## 2021-04-02 PROCEDURE — 51701 INSERT BLADDER CATHETER: CPT

## 2021-04-02 NOTE — DISCUSSION/SUMMARY
[FreeTextEntry1] : \par Cynthia presents for followup on  recurrent UTI, OAB. REcurrent UTI resolved with keflex 250mg. OAB symptoms now more bothersome, discussed bladder training and if no improvement will return and consider medications.  Cont keflex ppx.. All questions were answered. \par \par [] keflex UTI ppx \par [] BT\par []  f/u 6  months

## 2021-04-02 NOTE — HISTORY OF PRESENT ILLNESS
[Cystocele (Obstetric)] : mild [Vaginal Wall Prolapse] : no [Rectal Prolapse] : no [Unable To Restrain Bowel Movement] : no [Feelings Of Urinary Urgency] : moderate [x1] : nocturia once nightly [Incomplete Emptying Of Bladder] : no [Urinary Frequency] : no [Pain During Urination (Dysuria)] : no [Urinary Tract Infection] : moderate [Constipation Obstructed Defecation] : mild [] : no [Incomplete Emptying Of Stool] : no [Pelvic Pain] : no [Vaginal Pain] : no [FreeTextEntry1] : \par Cynthia presents for followup on OAB and recurrent UTI,\par \par on keflex UTI ppx 250mg with no UTIS since last visit\par \par regarding OAB, symptoms now more bothersome,  drinking at least 8 cups of tea/day, she reports voiding 12 times a day, nocturia X 1, denies incontinence, under a lot of stress at home\par \par s/p uphold and TOT sling 11/2018, cystoscopy negative, \par recent sonogram with no stone/hydro

## 2021-04-02 NOTE — PROCEDURE
[Straight Catheterization] : insertion of a straight catheter [Urinary Tract Infection] : a urinary tract infection [Patient] : the patient [Intraurethral 2% Lidocaine Gel ___ (cc)] : Local Anesthesia: [unfilled] cc of 2% Lidocaine Gel was administered intraurethrally  [___ Fr Straight Tip] : a [unfilled] in [place] straight tip catheter [Clear] : clear [No Complications] : no complications [Tolerated Well] : the patient tolerated the procedure well [Post procedure instructions and information given] : Post procedure instructions and information were given and reviewed with patient. [0] : 0

## 2021-04-02 NOTE — PHYSICAL EXAM
[Chaperone Present] : A chaperone was present in the examining room during all aspects of the physical examination [No Acute Distress] : in no acute distress [Well developed] : well developed [Well Nourished] : ~L well nourished [Oriented x3] : oriented to person, place, and time [Normal Memory] : ~T memory was ~L unimpaired [Normal Mood/Affect] : mood and affect are normal [Cough] : no cough [Tenderness] : ~T no ~M abdominal tenderness observed [Distended] : not distended [None] : no CVA tenderness [Inguinal LAD] : no adenopathy was noted in the inguinal lymph nodes [Warm and Dry] : was warm and dry to touch [Normal Gait] : gait was normal [Labia Majora] : were normal [Labia Minora] : were normal [Normal Appearance] : general appearance was normal [Normal] : no abnormalities

## 2021-04-05 LAB
APPEARANCE: CLEAR
BACTERIA UR CULT: NORMAL
BACTERIA: NEGATIVE
BILIRUBIN URINE: NEGATIVE
BLOOD URINE: NEGATIVE
COLOR: COLORLESS
GLUCOSE QUALITATIVE U: NEGATIVE
HYALINE CASTS: 0 /LPF
KETONES URINE: NEGATIVE
LEUKOCYTE ESTERASE URINE: NEGATIVE
MICROSCOPIC-UA: NORMAL
NITRITE URINE: NEGATIVE
PH URINE: 7
PROTEIN URINE: NEGATIVE
RED BLOOD CELLS URINE: 0 /HPF
SPECIFIC GRAVITY URINE: 1.01
SQUAMOUS EPITHELIAL CELLS: 0 /HPF
UROBILINOGEN URINE: NORMAL
WHITE BLOOD CELLS URINE: 0 /HPF

## 2021-06-16 ENCOUNTER — APPOINTMENT (OUTPATIENT)
Age: 81
End: 2021-06-16

## 2021-07-01 ENCOUNTER — APPOINTMENT (OUTPATIENT)
Dept: OPHTHALMOLOGY | Facility: CLINIC | Age: 81
End: 2021-07-01

## 2021-08-04 ENCOUNTER — NON-APPOINTMENT (OUTPATIENT)
Age: 81
End: 2021-08-04

## 2021-08-04 ENCOUNTER — APPOINTMENT (OUTPATIENT)
Dept: OPHTHALMOLOGY | Facility: CLINIC | Age: 81
End: 2021-08-04
Payer: MEDICARE

## 2021-08-04 PROCEDURE — 92134 CPTRZ OPH DX IMG PST SGM RTA: CPT

## 2021-08-04 PROCEDURE — 92014 COMPRE OPH EXAM EST PT 1/>: CPT

## 2021-08-21 ENCOUNTER — TRANSCRIPTION ENCOUNTER (OUTPATIENT)
Age: 81
End: 2021-08-21

## 2021-08-25 ENCOUNTER — NON-APPOINTMENT (OUTPATIENT)
Age: 81
End: 2021-08-25

## 2021-09-02 ENCOUNTER — RESULT CHARGE (OUTPATIENT)
Age: 81
End: 2021-09-02

## 2021-09-02 ENCOUNTER — APPOINTMENT (OUTPATIENT)
Age: 81
End: 2021-09-02
Payer: MEDICARE

## 2021-09-02 LAB
BILIRUB UR QL STRIP: NEGATIVE
CLARITY UR: CLEAR
COLLECTION METHOD: NORMAL
GLUCOSE UR-MCNC: NEGATIVE
HCG UR QL: 0.2 EU/DL
HGB UR QL STRIP.AUTO: NORMAL
KETONES UR-MCNC: NEGATIVE
LEUKOCYTE ESTERASE UR QL STRIP: NORMAL
NITRITE UR QL STRIP: NEGATIVE
PH UR STRIP: 5.5
PROT UR STRIP-MCNC: NEGATIVE
SP GR UR STRIP: 1.01

## 2021-09-02 PROCEDURE — 99213 OFFICE O/P EST LOW 20 MIN: CPT | Mod: 25

## 2021-09-02 PROCEDURE — 51701 INSERT BLADDER CATHETER: CPT

## 2021-09-02 PROCEDURE — 81002 URINALYSIS NONAUTO W/O SCOPE: CPT

## 2021-09-02 NOTE — HISTORY OF PRESENT ILLNESS
[FreeTextEntry1] : \par  81 y/o presetns with OAB symptoms. she was given macrobid BID with no improvement in her symptoms\par cx positive for ecoli, she reports persistent frequency/urgency, dysuria\par \par no CVA tenderness, no nausea/emesis

## 2021-09-02 NOTE — PROCEDURE
[Straight Catheterization] : insertion of a straight catheter [Urinary Frequency] : urinary frequency [Patient] : the patient [___ Fr Straight Tip] : a [unfilled] in [place] straight tip catheter [None] : there were no complications with the catheter insertion [Clear] : clear [Culture] : culture [Urinalysis] : urinalysis [No Complications] : no complications [Tolerated Well] : the patient tolerated the procedure well [Post procedure instructions and information given] : Post procedure instructions and information were given and reviewed with patient. [0] : 0

## 2021-09-02 NOTE — DISCUSSION/SUMMARY
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 now ceftin ordered. will f/u  after treatment.

## 2021-09-03 LAB
APPEARANCE: CLEAR
BACTERIA: NEGATIVE
BILIRUBIN URINE: NEGATIVE
BLOOD URINE: NEGATIVE
COLOR: COLORLESS
GLUCOSE QUALITATIVE U: NEGATIVE
HYALINE CASTS: 0 /LPF
KETONES URINE: NEGATIVE
LEUKOCYTE ESTERASE URINE: ABNORMAL
MICROSCOPIC-UA: NORMAL
NITRITE URINE: NEGATIVE
PH URINE: 6
PROTEIN URINE: NEGATIVE
RED BLOOD CELLS URINE: 0 /HPF
SPECIFIC GRAVITY URINE: 1.01
SQUAMOUS EPITHELIAL CELLS: 0 /HPF
UROBILINOGEN URINE: NORMAL
WHITE BLOOD CELLS URINE: 4 /HPF

## 2021-09-06 ENCOUNTER — NON-APPOINTMENT (OUTPATIENT)
Age: 81
End: 2021-09-06

## 2021-09-08 LAB — BACTERIA UR CULT: ABNORMAL

## 2021-10-04 ENCOUNTER — APPOINTMENT (OUTPATIENT)
Dept: UROGYNECOLOGY | Facility: CLINIC | Age: 81
End: 2021-10-04
Payer: MEDICARE

## 2021-10-04 VITALS
DIASTOLIC BLOOD PRESSURE: 63 MMHG | BODY MASS INDEX: 26.66 KG/M2 | HEIGHT: 65 IN | WEIGHT: 160 LBS | SYSTOLIC BLOOD PRESSURE: 118 MMHG

## 2021-10-04 DIAGNOSIS — N30.90 CYSTITIS, UNSPECIFIED W/OUT HEMATURIA: ICD-10-CM

## 2021-10-04 LAB
APPEARANCE: CLEAR
BILIRUBIN URINE: NEGATIVE
BLOOD URINE: NORMAL
COLOR: NORMAL
GLUCOSE QUALITATIVE U: NEGATIVE
KETONES URINE: NEGATIVE
LEUKOCYTE ESTERASE URINE: NORMAL
NITRITE URINE: NEGATIVE
PH URINE: 6
PROTEIN URINE: NEGATIVE
SPECIFIC GRAVITY URINE: 1.01
UROBILINOGEN URINE: 0.2

## 2021-10-04 PROCEDURE — 51701 INSERT BLADDER CATHETER: CPT

## 2021-10-04 PROCEDURE — 99213 OFFICE O/P EST LOW 20 MIN: CPT | Mod: 25

## 2021-10-04 PROCEDURE — 81003 URINALYSIS AUTO W/O SCOPE: CPT | Mod: QW

## 2021-10-04 RX ORDER — CEFUROXIME AXETIL 500 MG/1
500 TABLET ORAL
Qty: 20 | Refills: 0 | Status: DISCONTINUED | COMMUNITY
Start: 2020-09-21 | End: 2021-10-04

## 2021-10-04 RX ORDER — PHENAZOPYRIDINE 100 MG/1
100 TABLET, FILM COATED ORAL 3 TIMES DAILY
Qty: 6 | Refills: 0 | Status: DISCONTINUED | COMMUNITY
Start: 2020-09-18 | End: 2021-10-04

## 2021-10-04 RX ORDER — CEPHALEXIN 250 MG/1
250 TABLET ORAL
Qty: 30 | Refills: 5 | Status: DISCONTINUED | COMMUNITY
Start: 2020-09-11 | End: 2021-10-04

## 2021-10-04 RX ORDER — FOSFOMYCIN TROMETHAMINE 3 G/1
3 POWDER ORAL
Qty: 1 | Refills: 0 | Status: DISCONTINUED | COMMUNITY
Start: 2020-09-18 | End: 2021-10-04

## 2021-10-04 RX ORDER — NITROFURANTOIN (MONOHYDRATE/MACROCRYSTALS) 25; 75 MG/1; MG/1
100 CAPSULE ORAL
Qty: 6 | Refills: 0 | Status: DISCONTINUED | COMMUNITY
Start: 2019-09-23 | End: 2021-10-04

## 2021-10-04 RX ORDER — NITROFURANTOIN (MONOHYDRATE/MACROCRYSTALS) 25; 75 MG/1; MG/1
100 CAPSULE ORAL TWICE DAILY
Qty: 14 | Refills: 0 | Status: DISCONTINUED | COMMUNITY
Start: 2020-02-24 | End: 2021-10-04

## 2021-10-04 RX ORDER — CEPHALEXIN 250 MG/1
250 CAPSULE ORAL
Qty: 90 | Refills: 2 | Status: DISCONTINUED | COMMUNITY
Start: 2021-04-02 | End: 2021-10-04

## 2021-10-04 RX ORDER — CEPHALEXIN 250 MG/1
250 CAPSULE ORAL
Qty: 30 | Refills: 5 | Status: DISCONTINUED | COMMUNITY
Start: 2019-07-22 | End: 2021-10-04

## 2021-10-04 RX ORDER — METHENAMINE HIPPURATE 1 G/1
1 TABLET ORAL TWICE DAILY
Qty: 60 | Refills: 11 | Status: ACTIVE | COMMUNITY
Start: 2021-10-04 | End: 1900-01-01

## 2021-10-04 RX ORDER — CEPHALEXIN 250 MG/1
250 CAPSULE ORAL
Qty: 30 | Refills: 0 | Status: DISCONTINUED | COMMUNITY
Start: 2020-02-24 | End: 2021-10-04

## 2021-10-04 RX ORDER — LEVOFLOXACIN 500 MG/1
500 TABLET, FILM COATED ORAL DAILY
Qty: 5 | Refills: 0 | Status: DISCONTINUED | COMMUNITY
Start: 2021-09-15 | End: 2021-10-04

## 2021-10-04 RX ORDER — CEPHALEXIN 500 MG/1
500 CAPSULE ORAL
Qty: 10 | Refills: 0 | Status: DISCONTINUED | COMMUNITY
Start: 2019-07-15 | End: 2021-10-04

## 2021-10-04 RX ORDER — CIPROFLOXACIN HYDROCHLORIDE 500 MG/1
500 TABLET, FILM COATED ORAL
Qty: 10 | Refills: 0 | Status: DISCONTINUED | COMMUNITY
Start: 2020-09-11 | End: 2021-10-04

## 2021-10-04 RX ORDER — CEFUROXIME AXETIL 500 MG/1
500 TABLET ORAL
Qty: 14 | Refills: 0 | Status: DISCONTINUED | COMMUNITY
Start: 2021-09-02 | End: 2021-10-04

## 2021-10-04 RX ORDER — CEPHALEXIN 500 MG/1
500 CAPSULE ORAL
Qty: 14 | Refills: 0 | Status: DISCONTINUED | COMMUNITY
Start: 2021-08-26 | End: 2021-10-04

## 2021-10-04 RX ORDER — CEPHALEXIN 500 MG/1
500 CAPSULE ORAL
Qty: 10 | Refills: 0 | Status: DISCONTINUED | COMMUNITY
Start: 2020-09-03 | End: 2021-10-04

## 2021-10-04 NOTE — PROCEDURE
[Straight Catheterization] : insertion of a straight catheter [Urinary Tract Infection] : a urinary tract infection [Patient] : the patient [Intraurethral 2% Lidocaine Gel ___ (cc)] : Local Anesthesia: [unfilled] cc of 2% Lidocaine Gel was administered intraurethrally  [___ Fr Straight Tip] : a [unfilled] in [place] straight tip catheter [Clear] : clear [No Complications] : no complications [Tolerated Well] : the patient tolerated the procedure well [Post procedure instructions and information given] : Post procedure instructions and information were given and reviewed with patient. [0] : 0

## 2021-10-04 NOTE — HISTORY OF PRESENT ILLNESS
[Cystocele (Obstetric)] : mild [Vaginal Wall Prolapse] : no [Rectal Prolapse] : no [Unable To Restrain Bowel Movement] : no [Feelings Of Urinary Urgency] : moderate [x1] : nocturia once nightly [Incomplete Emptying Of Bladder] : no [Urinary Frequency] : no [Pain During Urination (Dysuria)] : no [Urinary Tract Infection] : moderate [Constipation Obstructed Defecation] : mild [] : no [Incomplete Emptying Of Stool] : no [Pelvic Pain] : no [Vaginal Pain] : no [de-identified] : now resolved  [FreeTextEntry1] : \par Cynthia presents for followup on OAB and recurrent UTI, she was resolution of symptoms with Levaquin, \par \par currently not on abx, has persistent frequency/urgency, dyusria now resolved, \par \par \par regarding OAB, drinking at least 8 cups of tea/day, she reports voiding 12 times a day, nocturia X 1, denies incontinence\par \par s/p uphold and TOT sling 11/2018, cystoscopy negative, \par recent sonogram with no stone/hydro

## 2021-10-04 NOTE — DISCUSSION/SUMMARY
[FreeTextEntry1] : \par Cynthia presents for f/u on recurrent UTI and OAB symptoms, h/o sling, neg cysto, negative renal sonogram. \par \par 1. Recurrent UTI: unclear if UTI vs OAB, discussed trial of methenamine with vit C, h/o PE/DVT and declines estrogen\par \par 2. OAB: does not want medication, again discussed decreasing tea and possible trial of PTNS. IUGA PTNS given to her. All questions answered.\par \par [] u/a, cx\par [] methenamine + vit C\par [] considering PTNS\par [] otherwise return in 2-3 months

## 2021-10-04 NOTE — PHYSICAL EXAM
[Chaperone Present] : A chaperone was present in the examining room during all aspects of the physical examination [No Acute Distress] : in no acute distress [Well developed] : well developed [Well Nourished] : ~L well nourished [Oriented x3] : oriented to person, place, and time [Normal Memory] : ~T memory was ~L unimpaired [Normal Mood/Affect] : mood and affect are normal [Cough] : no cough [Inguinal LAD] : no adenopathy was noted in the inguinal lymph nodes [Warm and Dry] : was warm and dry to touch [Vulvar Atrophy] : vulvar atrophy [Labia Majora] : were normal [Labia Minora] : were normal [Atrophy] : atrophy [3] : 3 [Normal] : no abnormalities [Post Void Residual ____ml] : post void residual was [unfilled] ml [de-identified] : no prolapse

## 2021-10-05 LAB
APPEARANCE: CLEAR
BACTERIA: NEGATIVE
BILIRUBIN URINE: NEGATIVE
BLOOD URINE: NEGATIVE
COLOR: COLORLESS
GLUCOSE QUALITATIVE U: NEGATIVE
HYALINE CASTS: 0 /LPF
KETONES URINE: NEGATIVE
LEUKOCYTE ESTERASE URINE: NEGATIVE
MICROSCOPIC-UA: NORMAL
NITRITE URINE: NEGATIVE
PH URINE: 6.5
PROTEIN URINE: NEGATIVE
RED BLOOD CELLS URINE: 0 /HPF
SPECIFIC GRAVITY URINE: 1.01
SQUAMOUS EPITHELIAL CELLS: 0 /HPF
UROBILINOGEN URINE: NORMAL
WHITE BLOOD CELLS URINE: 0 /HPF

## 2021-10-06 LAB — BACTERIA UR CULT: NORMAL

## 2021-11-23 ENCOUNTER — TRANSCRIPTION ENCOUNTER (OUTPATIENT)
Age: 81
End: 2021-11-23

## 2021-12-06 ENCOUNTER — APPOINTMENT (OUTPATIENT)
Dept: UROGYNECOLOGY | Facility: CLINIC | Age: 81
End: 2021-12-06
Payer: MEDICARE

## 2021-12-06 ENCOUNTER — RESULT CHARGE (OUTPATIENT)
Age: 81
End: 2021-12-06

## 2021-12-06 VITALS — DIASTOLIC BLOOD PRESSURE: 81 MMHG | SYSTOLIC BLOOD PRESSURE: 125 MMHG

## 2021-12-06 LAB
BILIRUB UR QL STRIP: NEGATIVE
CLARITY UR: CLEAR
COLLECTION METHOD: NORMAL
GLUCOSE UR-MCNC: NEGATIVE
HCG UR QL: 0.2 EU/DL
HGB UR QL STRIP.AUTO: NORMAL
KETONES UR-MCNC: NEGATIVE
LEUKOCYTE ESTERASE UR QL STRIP: NORMAL
NITRITE UR QL STRIP: NEGATIVE
PH UR STRIP: 7
PROT UR STRIP-MCNC: NEGATIVE
SP GR UR STRIP: 1.02

## 2021-12-06 PROCEDURE — 51798 US URINE CAPACITY MEASURE: CPT

## 2021-12-06 PROCEDURE — 81003 URINALYSIS AUTO W/O SCOPE: CPT | Mod: QW

## 2021-12-06 PROCEDURE — 99213 OFFICE O/P EST LOW 20 MIN: CPT

## 2021-12-06 NOTE — HISTORY OF PRESENT ILLNESS
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

## 2021-12-06 NOTE — DISCUSSION/SUMMARY
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 all questions were answered.

## 2022-02-02 ENCOUNTER — NON-APPOINTMENT (OUTPATIENT)
Age: 82
End: 2022-02-02

## 2022-02-02 ENCOUNTER — APPOINTMENT (OUTPATIENT)
Dept: OPHTHALMOLOGY | Facility: CLINIC | Age: 82
End: 2022-02-02
Payer: MEDICARE

## 2022-02-02 PROCEDURE — 92134 CPTRZ OPH DX IMG PST SGM RTA: CPT

## 2022-02-02 PROCEDURE — 92014 COMPRE OPH EXAM EST PT 1/>: CPT

## 2022-03-23 ENCOUNTER — APPOINTMENT (OUTPATIENT)
Dept: ULTRASOUND IMAGING | Facility: CLINIC | Age: 82
End: 2022-03-23
Payer: MEDICARE

## 2022-03-23 PROCEDURE — 93925 LOWER EXTREMITY STUDY: CPT

## 2022-04-21 ENCOUNTER — APPOINTMENT (OUTPATIENT)
Dept: ORTHOPEDIC SURGERY | Facility: CLINIC | Age: 82
End: 2022-04-21
Payer: MEDICARE

## 2022-04-21 VITALS — WEIGHT: 185 LBS | HEIGHT: 65 IN | BODY MASS INDEX: 30.82 KG/M2

## 2022-04-21 DIAGNOSIS — G57.60 LESION OF PLANTAR NERVE, UNSPECIFIED LOWER LIMB: ICD-10-CM

## 2022-04-21 DIAGNOSIS — M79.671 PAIN IN RIGHT FOOT: ICD-10-CM

## 2022-04-21 DIAGNOSIS — M79.605 PAIN IN RIGHT LEG: ICD-10-CM

## 2022-04-21 DIAGNOSIS — M79.672 PAIN IN RIGHT LEG: ICD-10-CM

## 2022-04-21 DIAGNOSIS — M79.671 PAIN IN RIGHT LEG: ICD-10-CM

## 2022-04-21 DIAGNOSIS — M79.604 PAIN IN RIGHT LEG: ICD-10-CM

## 2022-04-21 PROCEDURE — 99213 OFFICE O/P EST LOW 20 MIN: CPT

## 2022-04-21 NOTE — PHYSICAL EXAM
[Right] : right foot and ankle [3rd] : 3rd [NL (40)] : plantar flexion 40 degrees [NL 30)] : inversion 30 degrees [NL (20)] : eversion 20 degrees [] : no generalized ligamentous laxity [5___] : [facility] 5[unfilled]/5 [2+] : posterior tibialis pulse: 2+

## 2022-04-21 NOTE — REASON FOR VISIT
[FreeTextEntry2] : RIGHT FOOT CALUS PAINFUL AT 3RD TOE AND BILATERAL GREAT TOE POSSIBLE INGROWN TOE NAILS\par DR WRIGHT 11/17/2021

## 2022-04-21 NOTE — HISTORY OF PRESENT ILLNESS
[de-identified] : Injury Details: The location of the patients problem is right FOOT PAINFUL CALUSE AND BILATERAL INGROWN TOE NAILS . The injury was gradual. The patient has not been treated for this problem before. The patient has not had surgery for this problem in the past. The patient has not had physical therapy for this problem in the past.. \par \par 4/21/22: Pt reports increase in pain in 3rd toe since last visit with NKI\par Reports no relief with lesion debridement at last visit.  [de-identified] : 12/16/2021, 11/2021 [de-identified] : Dr Kerr, Dr Paz [de-identified] : DEBRIDEMENT OF LESION 3RD LESION RIGHT FOOT.\par SILOPADS TO BE USED TO REDUCE PRESSURE.

## 2022-05-06 ENCOUNTER — APPOINTMENT (OUTPATIENT)
Dept: ORTHOPEDIC SURGERY | Facility: CLINIC | Age: 82
End: 2022-05-06
Payer: MEDICARE

## 2022-05-06 VITALS — WEIGHT: 185 LBS | BODY MASS INDEX: 30.82 KG/M2 | HEIGHT: 65 IN

## 2022-05-06 DIAGNOSIS — M18.9 OSTEOARTHRITIS OF FIRST CARPOMETACARPAL JOINT, UNSPECIFIED: ICD-10-CM

## 2022-05-06 PROCEDURE — 99213 OFFICE O/P EST LOW 20 MIN: CPT

## 2022-05-06 NOTE — HISTORY OF PRESENT ILLNESS
[de-identified] : 80F with PMHx of hypertension, hypothyroid and arthritis presents today with bilateral hand pain that has been on going for "years" states it has gotten progressively worse over time. Patient states she has not tried taking any medication for the pain. Patient states she is RHD and the Right hand is worse. 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 Patient reports still having severe symptoms. Would like to proceed with CSI today.\par \par 5/6/22: f/u bilateral thumbs. Admits to having pain again. Admits to pain has since went into the entire hand. Difficulty opening bottles, grasping items. Denies numbness/tingling. Admits CSI from 11/2021 did not help .

## 2022-05-06 NOTE — ASSESSMENT
[FreeTextEntry1] : Bilateral thumb CMC Arthritis - Reviewed radiographs with patient and discussed pathoanatomy. Discussed treatment ladder including NSAIDs prn, bracing (MetaGrip), hand therapy (thenar cone strengthening), CSI and arthroplasty. 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

## 2022-05-06 NOTE — IMAGING
[de-identified] : Right hand with no swelling nor erythema. +ttp at thumb CMC, +grind, -ttp at radial styloid, thumb MP (stable), IP and A1 pulley. Able to make fist, oppose thumb to small finger and abduct fingers. -phalen, -tinel at carpal, cubital and Guyon. Sensation intact throughout. <2sec cap refill. \par \par Right wrist radiographs with Grade III thumb CMC arthritis.  \par \par \par Left hand with no swelling nor erythema. +ttp at thumb CMC, +grind, -ttp at radial styloid, thumb MP (stable), IP and A1 pulley. Able to make fist, oppose thumb to small finger and abduct fingers. -phalen, -tinel at carpal, cubital and Guyon. Sensation intact throughout. <2sec cap refill. \par \par Left wrist radiographs with Grade III thumb CMC arthritis.

## 2022-06-10 ENCOUNTER — APPOINTMENT (OUTPATIENT)
Dept: UROGYNECOLOGY | Facility: CLINIC | Age: 82
End: 2022-06-10
Payer: MEDICARE

## 2022-06-10 VITALS
DIASTOLIC BLOOD PRESSURE: 75 MMHG | SYSTOLIC BLOOD PRESSURE: 126 MMHG | BODY MASS INDEX: 48.82 KG/M2 | HEIGHT: 65 IN | WEIGHT: 293 LBS

## 2022-06-10 DIAGNOSIS — R35.0 FREQUENCY OF MICTURITION: ICD-10-CM

## 2022-06-10 PROCEDURE — 99213 OFFICE O/P EST LOW 20 MIN: CPT

## 2022-06-10 NOTE — DISCUSSION/SUMMARY
[FreeTextEntry1] : The patient was counseled regarding the pathophysiology overactive bladder, urinary incontinence and frequent UTI. A total of approximately 25-30 minutes was spent on this visit, greater than 100%of which was spent on counseling. She was also counseled regarding the risks, benefits, indications, and alternatives of further evaluations studies, as well as various management options. She was given verbal and written information/education on pelvic floor muscle exercises, avoidance of dietary bladder irritants, and other strategies to improve bladder control.  Pharmacologic management of overactive bladder and urgency incontinence was discussed. After a detailed discussion, following management plan was outlined:\par 1. 12 weeks trial of behavioral modification, bladder retraining and home exercise program as instructed.\par 2. UA with micro and urine culture was ordered to exclude UTI.\par 3. Night time fluid restriction and other strategies to decrease nocturia were reviewed.\par 4. She declines OAB medications\par 5.  She declined advanced treatment options including PTNS\par 6. F/u in 3 months. Will again offer further evaluation with urodynamic testing. . \par

## 2022-06-10 NOTE — PHYSICAL EXAM
[No Acute Distress] : in no acute distress [Well developed] : well developed [Oriented x3] : oriented to person, place, and time [Normal Memory] : ~T memory was ~L unimpaired

## 2022-06-11 LAB
APPEARANCE: CLEAR
BACTERIA: NEGATIVE
BILIRUBIN URINE: NEGATIVE
BLOOD URINE: NEGATIVE
COLOR: COLORLESS
GLUCOSE QUALITATIVE U: NEGATIVE
HYALINE CASTS: 0 /LPF
KETONES URINE: NEGATIVE
LEUKOCYTE ESTERASE URINE: NEGATIVE
MICROSCOPIC-UA: NORMAL
NITRITE URINE: NEGATIVE
PH URINE: 6
PROTEIN URINE: NEGATIVE
RED BLOOD CELLS URINE: 0 /HPF
SPECIFIC GRAVITY URINE: 1.01
SQUAMOUS EPITHELIAL CELLS: 1 /HPF
UROBILINOGEN URINE: NORMAL
WHITE BLOOD CELLS URINE: 0 /HPF

## 2022-06-13 LAB — BACTERIA UR CULT: NORMAL

## 2022-07-25 RX ORDER — CEPHALEXIN 500 MG/1
500 CAPSULE ORAL
Qty: 14 | Refills: 0 | Status: ACTIVE | COMMUNITY
Start: 2022-07-25 | End: 1900-01-01

## 2022-07-26 ENCOUNTER — NON-APPOINTMENT (OUTPATIENT)
Age: 82
End: 2022-07-26

## 2022-08-29 DIAGNOSIS — R30.0 DYSURIA: ICD-10-CM

## 2022-08-31 LAB
APPEARANCE: CLEAR
BACTERIA: NEGATIVE
BILIRUBIN URINE: NEGATIVE
BLOOD URINE: NEGATIVE
COLOR: COLORLESS
GLUCOSE QUALITATIVE U: NEGATIVE
HYALINE CASTS: 0 /LPF
KETONES URINE: NEGATIVE
LEUKOCYTE ESTERASE URINE: NEGATIVE
MICROSCOPIC-UA: NORMAL
NITRITE URINE: NEGATIVE
PH URINE: 7
PROTEIN URINE: NEGATIVE
RED BLOOD CELLS URINE: 1 /HPF
SPECIFIC GRAVITY URINE: 1
SQUAMOUS EPITHELIAL CELLS: 0 /HPF
UROBILINOGEN URINE: NORMAL
WHITE BLOOD CELLS URINE: 1 /HPF

## 2022-09-01 RX ORDER — FOSFOMYCIN TROMETHAMINE 3 G/1
3 POWDER ORAL
Qty: 2 | Refills: 0 | Status: ACTIVE | COMMUNITY
Start: 2022-09-01 | End: 1900-01-01

## 2022-09-15 ENCOUNTER — NON-APPOINTMENT (OUTPATIENT)
Age: 82
End: 2022-09-15

## 2022-09-15 RX ORDER — NITROFURANTOIN (MONOHYDRATE/MACROCRYSTALS) 25; 75 MG/1; MG/1
100 CAPSULE ORAL
Qty: 14 | Refills: 0 | Status: ACTIVE | COMMUNITY
Start: 2022-09-15 | End: 1900-01-01

## 2022-09-29 ENCOUNTER — APPOINTMENT (OUTPATIENT)
Dept: PODIATRY | Facility: CLINIC | Age: 82
End: 2022-09-29

## 2022-09-29 VITALS — HEIGHT: 65 IN | WEIGHT: 185 LBS | BODY MASS INDEX: 30.82 KG/M2

## 2022-09-29 DIAGNOSIS — M20.42 OTHER HAMMER TOE(S) (ACQUIRED), RIGHT FOOT: ICD-10-CM

## 2022-09-29 DIAGNOSIS — M20.41 OTHER HAMMER TOE(S) (ACQUIRED), RIGHT FOOT: ICD-10-CM

## 2022-09-29 DIAGNOSIS — M79.674 PAIN IN RIGHT TOE(S): ICD-10-CM

## 2022-09-29 DIAGNOSIS — M79.671 PAIN IN RIGHT FOOT: ICD-10-CM

## 2022-09-29 PROCEDURE — 99213 OFFICE O/P EST LOW 20 MIN: CPT | Mod: 25

## 2022-09-29 PROCEDURE — 20600 DRAIN/INJ JOINT/BURSA W/O US: CPT | Mod: RT

## 2022-09-29 NOTE — ASSESSMENT
[FreeTextEntry1] : CELESTONE 6 MG APPLIED WITH LIDOCAINE PLAIN 10 MG  TO REDUCE PAIN AND SWELLING\par CELESTONE 2 UNITS, LIDOCAINE PLAIN 2 UNITS\par NSAID OF CHOICE FOR PAIN.\par TYLENOL FOR PAIN\par COLD COMPRESSES.\par \par \par \par

## 2022-09-29 NOTE — PHYSICAL EXAM
[Right] : right foot and ankle [Mild] : mild swelling of toe(s) [3rd] : 3rd [1+] : posterior tibialis pulse: 1+ [] : no sign of infection

## 2022-10-06 ENCOUNTER — APPOINTMENT (OUTPATIENT)
Dept: UROGYNECOLOGY | Facility: CLINIC | Age: 82
End: 2022-10-06

## 2022-10-06 VITALS
SYSTOLIC BLOOD PRESSURE: 139 MMHG | DIASTOLIC BLOOD PRESSURE: 75 MMHG | HEIGHT: 65 IN | WEIGHT: 185 LBS | BODY MASS INDEX: 30.82 KG/M2

## 2022-10-06 DIAGNOSIS — N95.2 POSTMENOPAUSAL ATROPHIC VAGINITIS: ICD-10-CM

## 2022-10-06 PROCEDURE — 99213 OFFICE O/P EST LOW 20 MIN: CPT

## 2022-10-06 RX ORDER — MIRABEGRON 25 MG/1
25 TABLET, FILM COATED, EXTENDED RELEASE ORAL
Qty: 30 | Refills: 2 | Status: ACTIVE | COMMUNITY
Start: 2022-10-06 | End: 1900-01-01

## 2022-10-06 RX ORDER — PHENAZOPYRIDINE 200 MG/1
200 TABLET, FILM COATED ORAL 3 TIMES DAILY
Qty: 15 | Refills: 3 | Status: ACTIVE | COMMUNITY
Start: 2022-10-06 | End: 1900-01-01

## 2022-10-06 NOTE — ASSESSMENT
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  She had 1 year of no UTI prior to presentation.  Her recent use of sanitary pads for increased fluid following intake of furosemide might be contributing.  She continues to consume large volumes of tea.  She previously had a normal postvoid residual, normal cystoscopy and normal renal ultrasound

## 2022-10-06 NOTE — HISTORY OF PRESENT ILLNESS
[FreeTextEntry1] : Patient is a 81 years old with hx of uphold and TOT sling 11/2018 by Dr. Clark, presenting with hx of frequent UTIs and OAB.  She was under the care of my partner Dr. ROHINI Hope till Dec 2021.  I started seeing her in June 2022 when she free presented with urinary frequency after starting taking furosemide.  Her urine culture from June was negative.  She was consuming large volumes of tea at the time and decided to try behavioral management for overactive bladder.  She then called in July with concerns of urine infection. Patient's urine culture from 7/21/2022 grew E. coli.  She was treated with Keflex . Urine culture from 8/30/22 grew low cfu of Klebsiella , she was treated with Munorol ..  She then called on 9/15/2022 and requested a prescription for Macrobid due to ongoing concerns for UTI.  She was treated with Macrobid.\par She presents today for a followup.  States that she still have UTI despite having previous courses of antibiotics.  Her main concern is urinary frequency, intermittent burning sensation and intermittent urine odor.  She has to increase use of incontinence pads since she started taking furosemide.  She denies constipation or diarrhea.\par She is taking D-mannose. She has previously taken Keflex 250 mg for UTI ppx . Had negative cysto by Dr. Rosas and was noted to have normal postvoid residual on previous exam by her.  \par Renal ultrasound from Sept 2020: small (1.3 cm) right renal cortical cysts. No hydronephrosis, normal PVR\par

## 2022-10-06 NOTE — DISCUSSION/SUMMARY
[Reviewed Clinical Lab Test(s)] : Results of clinical tests were reviewed. [Reviewed Radiology Report(s)] : Radiology reports were reviewed. [Visit Time ___ Minutes] : [unfilled] minutes [Face to Face Time___ Minutes] : with [unfilled] minutes in face to face consultation. [FreeTextEntry1] : Counseled patient regarding treatment options for overactive bladder including avoidance of bladder irritants ( tea),, medications, advanced treatment options including Botox, SNM and PTNS.  Patient would like to start with a trial of medication.  She has previously used Myrbetriq between 2015 and 2016 which was prescribed by Dr. Yenifer Rodriguez.  She does not remember taking it.  Her blood pressure are well controlled.  Prescription for low-dose Myrbetriq was sent to her pharmacy she will follow-up in 1 month.  If no improvement, I will recommend urodynamic testing.\par Patient was also counseled regarding treatment and prevention of frequent urinary tract infections.  UA and culture ordered today.  She was provided with a prescription for UA and culture to get tested as needed.  She was  told to call the office when she goes to the lab for urine culture.  Use of Pyridium for symptomatic relief of dysuria was discussed.  She has no contraindication for vaginal estrogen use.  Prescription for estrogen cream sent to her pharmacy.  Also provided her information for Imvexxy in case she finds that prescription too expensive at [facility].\par Advise use of fiber supplement to avoid constipation and diarrhea.  Discussed perineal care.\par If she continues to have UTIs while using estrogen cream, will recommend antibiotic prophylaxis again.  She is comfortable with this plan.  Follow-up in a month

## 2022-11-11 ENCOUNTER — APPOINTMENT (OUTPATIENT)
Dept: UROGYNECOLOGY | Facility: CLINIC | Age: 82
End: 2022-11-11

## 2022-11-11 VITALS
HEIGHT: 65 IN | WEIGHT: 185 LBS | DIASTOLIC BLOOD PRESSURE: 70 MMHG | SYSTOLIC BLOOD PRESSURE: 118 MMHG | BODY MASS INDEX: 30.82 KG/M2

## 2022-11-11 PROCEDURE — 99213 OFFICE O/P EST LOW 20 MIN: CPT

## 2022-11-11 NOTE — ASSESSMENT
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 She had 1 year of no UTI prior to presentation but was using low-dose Keflex for UTI prophylaxis.  She has been prescribed furosemide for lower extremity edema which is likely contributing to her urinary frequency and nocturia

## 2022-11-11 NOTE — HISTORY OF PRESENT ILLNESS
[FreeTextEntry1] : Patient is a 81 years old with hx of uphold and TOT sling 11/2018 by Dr. Clark, presenting with hx of frequent UTIs and OAB. She was under the care of my partner Dr. ROHINI Hope till Dec 2021. I started seeing her in June 2022 when she free presented with urinary frequency after starting taking furosemide. Her urine culture from June was negative. She was consuming large volumes of tea at the time and decided to try behavioral management for overactive bladder. She then called in July with concerns of urine infection. Patient's urine culture from 7/21/2022 grew E. coli. She was treated with Keflex . Urine culture from 8/30/22 grew low cfu of Klebsiella , she was treated with Munorol .. She then called on 9/15/2022 and requested a prescription for Macrobid due to ongoing concerns for UTI. She was treated with Macrobid.  \par \par She has previously taken Keflex 250 mg for UTI ppx . Had negative cysto by Dr. Rosas and was noted to have normal postvoid residual on previous exam by her. \par Renal ultrasound from Sept 2020: small (1.3 cm) right renal cortical cysts. No hydronephrosis, normal PVR\par \par \par She was last seen on 10/6/22.  She was started on low-dose mid Myrbetriq and vaginal estrogen cream.\par She presents today for a followup.  She has not noticed any difference in her urinary frequency and urgency with Myrbetriq.  Also does not note any improvement with use of vaginal estrogen.  She is also concerned about the high cost of the medications.  States that she still have UTI despite having previous courses of antibiotics. Her main concern is urinary frequency, intermittent burning sensation. She denies constipation or diarrhea.\par

## 2022-11-11 NOTE — DISCUSSION/SUMMARY
[FreeTextEntry1] : We discussed strategies for UTI prevention.  I had a urine culture today.  I also sent a prescription for low-dose Keflex for 3 months for UTI prophylaxis.  She is leaving for [place] next week.  If the culture returns positive and sensitive to Keflex, will instruct her to increase the dose of the Keflex to 500 mg p.o. twice daily x7 days.  If it is positive but not sensitive to Keflex then she will be prescribed alternative antibiotic.  She has a prescription for urine culture.  I advised her to do a test of care after every antibiotic course.\par I also recommended urodynamic testing.  She wants to do it after Thanksgiving when she returns from [place].  She just filled a prescription for 25 mg of Myrbetriq.  Her blood pressures are within normal limit.  I advised her to increase the dose of the Myrbetriq to 50 mg daily for the time being.  I provided her 2 weeks of sample.  Also advised her to stop taking Myrbetriq 1 week before the urodynamic test.  She verbalized understanding.  I discussed the advanced treatment options for refractory overactive bladder including PTNS, sacral neuromodulation and Botox.  I would like to avoid Botox due to her history of frequent urinary tract infection and postoperative urinary retention.  She verbalized understanding.  Approximately 25 minutes were spent in direct today's encounter.  100% of the time was spent in face-to-face patient counseling and reviewing a of lab tests and imaging

## 2022-12-06 RX ORDER — MIRABEGRON 50 MG/1
50 TABLET, FILM COATED, EXTENDED RELEASE ORAL
Qty: 30 | Refills: 6 | Status: ACTIVE | COMMUNITY
Start: 2022-12-06 | End: 1900-01-01

## 2023-01-01 ENCOUNTER — NON-APPOINTMENT (OUTPATIENT)
Age: 83
End: 2023-01-01

## 2023-01-27 ENCOUNTER — APPOINTMENT (OUTPATIENT)
Dept: ORTHOPEDIC SURGERY | Facility: CLINIC | Age: 83
End: 2023-01-27
Payer: MEDICARE

## 2023-01-27 DIAGNOSIS — R29.898 OTHER SYMPTOMS AND SIGNS INVOLVING THE MUSCULOSKELETAL SYSTEM: ICD-10-CM

## 2023-01-27 PROCEDURE — 73562 X-RAY EXAM OF KNEE 3: CPT | Mod: LT

## 2023-01-27 PROCEDURE — 99213 OFFICE O/P EST LOW 20 MIN: CPT

## 2023-01-27 NOTE — PHYSICAL EXAM
[NL (0)] : extension 0 degrees [4___] : hamstring 4[unfilled]/5 [Left] : left knee [AP] : anteroposterior [Lateral] : lateral [[place]] : skyline [Components well fixed, in good position] : Components well fixed, in good position [] : no guarding during exam [TWNoteComboBox7] : flexion 100 degrees

## 2023-01-27 NOTE — HISTORY OF PRESENT ILLNESS
[de-identified] : Patient presents for left knee pain. Previously treated with Dr Gilmore on 8/20/21 had geniculate nerve block 8/10/21

## 2023-01-27 NOTE — DISCUSSION/SUMMARY
[de-identified] : reviewed findings, anatomy and pathology  discussed and pt understands. questions answered, pt left pleased\par After discussion of Dx & Treatment options was discuss , pt elected to Tx non operatively with exercise , medications, physical therapy and activity modification.\par does not want injections or scan

## 2023-02-02 RX ORDER — CEPHALEXIN 250 MG/1
250 CAPSULE ORAL
Qty: 30 | Refills: 0 | Status: ACTIVE | COMMUNITY
Start: 2022-11-11 | End: 1900-01-01

## 2023-02-07 ENCOUNTER — FORM ENCOUNTER (OUTPATIENT)
Age: 83
End: 2023-02-07

## 2023-02-08 ENCOUNTER — FORM ENCOUNTER (OUTPATIENT)
Age: 83
End: 2023-02-08

## 2023-02-13 ENCOUNTER — FORM ENCOUNTER (OUTPATIENT)
Age: 83
End: 2023-02-13

## 2023-02-24 ENCOUNTER — APPOINTMENT (OUTPATIENT)
Dept: ORTHOPEDIC SURGERY | Facility: CLINIC | Age: 83
End: 2023-02-24
Payer: MEDICARE

## 2023-02-24 PROCEDURE — 20610 DRAIN/INJ JOINT/BURSA W/O US: CPT | Mod: LT

## 2023-02-24 PROCEDURE — 99214 OFFICE O/P EST MOD 30 MIN: CPT | Mod: 25

## 2023-02-24 NOTE — PROCEDURE
[Left] : of the left [Hip] : hip [Pain] : pain [Inflammation] : inflammation [X-ray evidence of Osteoarthritis on this or prior visit] : x-ray evidence of Osteoarthritis on this or prior visit [Alcohol] : alcohol [Betadine] : betadine [Ethyl Chloride sprayed topically] : ethyl chloride sprayed topically [Sterile technique used] : sterile technique used [___ cc    3mg] :  Betamethasone (Celestone) ~Vcc of 3mg [___ cc    1%] : Lidocaine ~Vcc of 1%  [] : Patient tolerated procedure well [Call if redness, pain or fever occur] : call if redness, pain or fever occur [Apply ice for 15min out of every hour for the next 12-24 hours as tolerated] : apply ice for 15 minutes out of every hour for the next 12-24 hours as tolerated [Patient was advised to rest the joint(s) for ____ days] : patient was advised to rest the joint(s) for [unfilled] days [Previous OTC use and PT nontherapeutic] : patient has tried OTC's including aspirin, Ibuprofen, Aleve, etc or prescription NSAIDS, and/or exercises at home and/or physical therapy without satisfactory response [Patient had decreased mobility in the joint] : patient had decreased mobility in the joint [Risks, benefits, alternatives discussed / Verbal consent obtained] : the risks benefits, and alternatives have been discussed, and verbal consent was obtained

## 2023-02-24 NOTE — HISTORY OF PRESENT ILLNESS
[de-identified] : Patient presents today for left knee. Patient reports going to about 4 sessions of PT which she feels is helping however she does report increasing pain in the hips.

## 2023-02-24 NOTE — PHYSICAL EXAM
[NL (0)] : extension 0 degrees [4___] : hamstring 4[unfilled]/5 [Left] : left knee [AP] : anteroposterior [Lateral] : lateral [[place]] : skyline [Components well fixed, in good position] : Components well fixed, in good position [5___] : adduction 5[unfilled]/5 [de-identified] : adduction 20 degrees [de-identified] : abduction 35 degrees [de-identified] : external rotation 40 degrees [] : no guarding during exam [TWNoteComboBox7] : flexion 100 degrees

## 2023-02-27 ENCOUNTER — APPOINTMENT (OUTPATIENT)
Dept: UROGYNECOLOGY | Facility: CLINIC | Age: 83
End: 2023-02-27
Payer: MEDICARE

## 2023-02-27 PROCEDURE — 51797 INTRAABDOMINAL PRESSURE TEST: CPT

## 2023-02-27 PROCEDURE — 51741 ELECTRO-UROFLOWMETRY FIRST: CPT

## 2023-02-27 PROCEDURE — 51784 ANAL/URINARY MUSCLE STUDY: CPT

## 2023-02-27 PROCEDURE — 51729 CYSTOMETROGRAM W/VP&UP: CPT

## 2023-03-07 ENCOUNTER — FORM ENCOUNTER (OUTPATIENT)
Age: 83
End: 2023-03-07

## 2023-03-23 ENCOUNTER — APPOINTMENT (OUTPATIENT)
Dept: UROGYNECOLOGY | Facility: CLINIC | Age: 83
End: 2023-03-23
Payer: MEDICARE

## 2023-03-23 VITALS
WEIGHT: 185 LBS | SYSTOLIC BLOOD PRESSURE: 117 MMHG | DIASTOLIC BLOOD PRESSURE: 74 MMHG | HEIGHT: 65 IN | BODY MASS INDEX: 30.82 KG/M2

## 2023-03-23 DIAGNOSIS — N39.0 URINARY TRACT INFECTION, SITE NOT SPECIFIED: ICD-10-CM

## 2023-03-23 PROCEDURE — 99214 OFFICE O/P EST MOD 30 MIN: CPT

## 2023-03-23 NOTE — DISCUSSION/SUMMARY
[FreeTextEntry1] : Patient was not able to continue Myrbetriq due to cost issue\par Low dose Solifenacin was prescribed\par Advised her to let her PCP the effect of HCTZ on urine frequency\par Discussed the findings of urodynamic testings.

## 2023-03-23 NOTE — HISTORY OF PRESENT ILLNESS
[FreeTextEntry1] : Patient is a 82 years old with hx of uphold and TOT sling 11/2018, presenting with hx of frequent UTIs and OAB. She presented for urodynamic testing on 2/27/23. Her urodynamic test findings include normal capacity, normal sensation, normal compliance, absence of detrusor overactivity, absence of stress urinary incontinence. Her pressure voiding study consistent with interrupted flow. \par Patient presents today for a follow up regarding hx of uti, OAB symptoms etc\par \par

## 2023-03-29 ENCOUNTER — NON-APPOINTMENT (OUTPATIENT)
Age: 83
End: 2023-03-29

## 2023-03-29 ENCOUNTER — APPOINTMENT (OUTPATIENT)
Dept: OPHTHALMOLOGY | Facility: CLINIC | Age: 83
End: 2023-03-29
Payer: MEDICARE

## 2023-03-29 PROCEDURE — 92134 CPTRZ OPH DX IMG PST SGM RTA: CPT

## 2023-03-29 PROCEDURE — 99214 OFFICE O/P EST MOD 30 MIN: CPT

## 2023-03-31 ENCOUNTER — NON-APPOINTMENT (OUTPATIENT)
Age: 83
End: 2023-03-31

## 2023-03-31 RX ORDER — SOLIFENACIN SUCCINATE 5 MG/1
5 TABLET ORAL
Qty: 30 | Refills: 3 | Status: ACTIVE | COMMUNITY
Start: 2023-03-23 | End: 1900-01-01

## 2023-04-04 ENCOUNTER — NON-APPOINTMENT (OUTPATIENT)
Age: 83
End: 2023-04-04

## 2023-04-12 NOTE — HISTORY OF PRESENT ILLNESS
Type of Form Received: orders    Form Received (Date) 4/12/23   Form Filled out No   Placed in provider folder Yes     
[FreeTextEntry1] : Patient is a 81 years old with hx of uphold and TOT sling 11/2018, presenting with hx of frequent UTIs and OAB who was under the care of my partner Dr. ROHINI Hope and was last seen in Dec 2021. \par She presents today for a followup.\par Denies dysuria. Denies ongoing concerns for UTI. She is taking D-mannose. She has previously taken Keflex  250 mg for UTI ppx . Had negative cysto  Her main concern today is urinary frequency. She reports drinking large volumes of water as well as several servings of teas and other bladder irritants. She was started on Lasix yesterday. \par Renal ultrasound from Sept 2020: small (1.3 cm) right renal cortical cysts. No hydronephrosis, normal PVR\par

## 2023-04-14 ENCOUNTER — APPOINTMENT (OUTPATIENT)
Dept: ORTHOPEDIC SURGERY | Facility: CLINIC | Age: 83
End: 2023-04-14
Payer: MEDICARE

## 2023-04-14 DIAGNOSIS — M25.562 PAIN IN LEFT KNEE: ICD-10-CM

## 2023-04-14 DIAGNOSIS — G89.29 PAIN IN LEFT KNEE: ICD-10-CM

## 2023-04-14 PROCEDURE — 99213 OFFICE O/P EST LOW 20 MIN: CPT

## 2023-04-14 NOTE — PHYSICAL EXAM
[5___] : adduction 5[unfilled]/5 [NL (0)] : extension 0 degrees [4___] : hamstring 4[unfilled]/5 [Left] : left knee [AP] : anteroposterior [Lateral] : lateral [[place]] : skyline [Components well fixed, in good position] : Components well fixed, in good position [de-identified] : adduction 20 degrees [de-identified] : abduction 35 degrees [de-identified] : external rotation 40 degrees [] : no guarding during exam [TWNoteComboBox7] : flexion 100 degrees

## 2023-04-14 NOTE — DISCUSSION/SUMMARY
[de-identified] : reviewed findings, anatomy and pathology  discussed and pt understands. questions answered, pt left pleased\par After discussion of Dx & Treatment options was discuss , pt elected to Tx non operatively with exercise , medications, and activity modification.\par

## 2023-05-15 ENCOUNTER — APPOINTMENT (OUTPATIENT)
Dept: ORTHOPEDIC SURGERY | Facility: CLINIC | Age: 83
End: 2023-05-15

## 2023-05-22 ENCOUNTER — APPOINTMENT (OUTPATIENT)
Dept: ORTHOPEDIC SURGERY | Facility: CLINIC | Age: 83
End: 2023-05-22
Payer: MEDICARE

## 2023-05-22 ENCOUNTER — NON-APPOINTMENT (OUTPATIENT)
Age: 83
End: 2023-05-22

## 2023-05-22 VITALS — HEIGHT: 65 IN | BODY MASS INDEX: 30.82 KG/M2 | WEIGHT: 185 LBS

## 2023-05-22 PROCEDURE — 99214 OFFICE O/P EST MOD 30 MIN: CPT

## 2023-05-22 PROCEDURE — 72100 X-RAY EXAM L-S SPINE 2/3 VWS: CPT

## 2023-05-22 RX ORDER — METHYLPREDNISOLONE 4 MG/1
4 TABLET ORAL
Qty: 1 | Refills: 0 | Status: ACTIVE | COMMUNITY
Start: 2023-05-22 | End: 1900-01-01

## 2023-05-22 NOTE — ASSESSMENT
[FreeTextEntry1] : Patient given prescription for MRI, follow up after study is completed to discuss results. \par \par Patient will begin Medrol.  Patient advised not to take any NSAIDs while taking the steroids. \par \par Begin HEP \par \par Recommend: - NSAID - Heating pad - Muscle relaxer - Core strengthening exercise - Hamstring stretching exercise Patient is given back rehabilitation exercise book.

## 2023-05-22 NOTE — HISTORY OF PRESENT ILLNESS
[Lower back] : lower back [Gradual] : gradual [9] : 9 [10] : 10 [Dull/Aching] : dull/aching [Shooting] : shooting [Stabbing] : stabbing [Constant] : constant [Nothing helps with pain getting better] : Nothing helps with pain getting better [Standing] : standing [Retired] : Work status: retired [de-identified] : Patient presents today with lower back pain for a few months with NKI. Previously had injections. States her pain is radiating into her right hip, denies groin pain. Denies taking pain medication. Denies recent imaging. [] : no [FreeTextEntry7] : into the right hip

## 2023-05-22 NOTE — HISTORY OF PRESENT ILLNESS
[Lower back] : lower back [Gradual] : gradual [9] : 9 [10] : 10 [Dull/Aching] : dull/aching [Shooting] : shooting [Stabbing] : stabbing [Constant] : constant [Nothing helps with pain getting better] : Nothing helps with pain getting better [Standing] : standing [Retired] : Work status: retired [de-identified] : Patient presents today with lower back pain for a few months with NKI. Previously had injections. States her pain is radiating into her right hip, denies groin pain. Denies taking pain medication. Denies recent imaging. [] : no [FreeTextEntry7] : into the right hip

## 2023-06-06 ENCOUNTER — APPOINTMENT (OUTPATIENT)
Dept: UROGYNECOLOGY | Facility: CLINIC | Age: 83
End: 2023-06-06
Payer: MEDICARE

## 2023-06-06 VITALS
WEIGHT: 165 LBS | SYSTOLIC BLOOD PRESSURE: 118 MMHG | HEIGHT: 65 IN | DIASTOLIC BLOOD PRESSURE: 70 MMHG | BODY MASS INDEX: 27.49 KG/M2

## 2023-06-06 PROCEDURE — 99213 OFFICE O/P EST LOW 20 MIN: CPT

## 2023-06-06 RX ORDER — METHOCARBAMOL 750 MG/1
750 TABLET, FILM COATED ORAL
Qty: 60 | Refills: 0 | Status: ACTIVE | COMMUNITY
Start: 2023-06-06 | End: 1900-01-01

## 2023-06-06 NOTE — DISCUSSION/SUMMARY
[FreeTextEntry1] : Reviewed the medications which are covered by her insurance plan.  this include tolterodine extended release.  Prescription for tolterodine sent to her pharmacy\par I encouraged her to call the office if she has any trouble getting the prescription filled.\par She also reports that she course of ciprofloxacin prescribed by urgent care few weeks ago.  She went there is thinking she had a urinary tract infection.  I encouraged her to call our office.  I also encouraged her to try to to get me the result of the urine culture from the urgent care.\par Follow-up in 3 months

## 2023-06-06 NOTE — HISTORY OF PRESENT ILLNESS
[FreeTextEntry1] : Patient is a 82 years old with hx of uphold and TOT sling 11/2018, presenting with hx of frequent UTIs and OAB. She presented for urodynamic testing on 2/27/23. Her urodynamic test findings include normal capacity, normal sensation, normal compliance, absence of detrusor overactivity, absence of stress urinary incontinence. Her pressure voiding study consistent with interrupted flow. \par Patient presents today for a follow up regarding hx of uti, OAB symptoms etc. She didn't take Solifenacin because it was not covered by her insurance.  She denies any worsening of her bladder symptoms.\par She is waiting to get an MRI of her lower back pain.  She reports worsening of her back pain.

## 2023-06-13 ENCOUNTER — FORM ENCOUNTER (OUTPATIENT)
Age: 83
End: 2023-06-13

## 2023-06-15 ENCOUNTER — APPOINTMENT (OUTPATIENT)
Dept: ORTHOPEDIC SURGERY | Facility: CLINIC | Age: 83
End: 2023-06-15
Payer: MEDICARE

## 2023-06-15 DIAGNOSIS — S50.12XA CONTUSION OF LEFT FOREARM, INITIAL ENCOUNTER: ICD-10-CM

## 2023-06-15 DIAGNOSIS — S42.034A NONDISPLACED FRACTURE OF LATERAL END OF RIGHT CLAVICLE, INITIAL ENCOUNTER FOR CLOSED FRACTURE: ICD-10-CM

## 2023-06-15 DIAGNOSIS — S80.02XA CONTUSION OF LEFT KNEE, INITIAL ENCOUNTER: ICD-10-CM

## 2023-06-15 DIAGNOSIS — S80.01XA CONTUSION OF RIGHT KNEE, INITIAL ENCOUNTER: ICD-10-CM

## 2023-06-15 DIAGNOSIS — S42.009P: ICD-10-CM

## 2023-06-15 DIAGNOSIS — S46.011A STRAIN OF MUSCLE(S) AND TENDON(S) OF THE ROTATOR CUFF OF RIGHT SHOULDER, INITIAL ENCOUNTER: ICD-10-CM

## 2023-06-15 DIAGNOSIS — S52.515A NONDISPLACED FRACTURE OF LEFT RADIAL STYLOID PROCESS, INITIAL ENCOUNTER FOR CLOSED FRACTURE: ICD-10-CM

## 2023-06-15 DIAGNOSIS — S50.11XA CONTUSION OF RIGHT FOREARM, INITIAL ENCOUNTER: ICD-10-CM

## 2023-06-15 PROCEDURE — 99213 OFFICE O/P EST LOW 20 MIN: CPT | Mod: 57

## 2023-06-15 PROCEDURE — 25600 CLTX DST RDL FX/EPHYS SEP WO: CPT | Mod: LT

## 2023-06-15 PROCEDURE — 23500 CLTX CLAVICULAR FX W/O MNPJ: CPT | Mod: RT

## 2023-06-15 NOTE — PHYSICAL EXAM
[NL (0-180)] : full active abduction 0-180 degrees [Sitting] : sitting [4 ___] : forward flexion 4[unfilled]/5 [4___] : internal rotation 4[unfilled]/5 [Right] : right shoulder [AC Joint Arthrosis] : AC Joint Arthrosis [Radial Styloid] : radial styloid [Ulna Styloid] : ulna styloid [Left] : left wrist [Outside films reviewed] : Outside films reviewed [The fracture is in acceptable alignment. There is progression in healing seen] : The fracture is in acceptable alignment. There is progression in healing seen [Fracture] : Fracture [] : no deformity [de-identified] : abrasions forearm [FreeTextEntry1] : AC joint [FreeTextEntry3] : abrasions hand , forearm wear immobilizer [de-identified] : pain [FreeTextEntry8] : radial styoid [TWNoteComboBox7] : dorsiflexion 20 degrees [TWNoteComboBox4] : volarflexion 20 degrees [de-identified] : radial deviation 0 degrees [TWNoteComboBox9] : ulnar deviation 0 degrees [TWNoteComboBox6] : pronation 50 degrees [de-identified] : supination 80 degrees

## 2023-06-15 NOTE — HISTORY OF PRESENT ILLNESS
[de-identified] : Patient presents today for evaluation of Right shoulder pain. Problem started after the patient had a fall on 6/13/23. Patient states she went to [facility] ER on 6/14/23 and had xrays performed. Patient states pain is a constant aching.

## 2023-06-16 ENCOUNTER — APPOINTMENT (OUTPATIENT)
Dept: ORTHOPEDIC SURGERY | Facility: CLINIC | Age: 83
End: 2023-06-16

## 2023-06-27 ENCOUNTER — FORM ENCOUNTER (OUTPATIENT)
Age: 83
End: 2023-06-27

## 2023-07-11 ENCOUNTER — APPOINTMENT (OUTPATIENT)
Dept: ORTHOPEDIC SURGERY | Facility: CLINIC | Age: 83
End: 2023-07-11
Payer: MEDICARE

## 2023-07-11 PROCEDURE — 99214 OFFICE O/P EST MOD 30 MIN: CPT

## 2023-07-11 RX ORDER — DICLOFENAC SODIUM 1 %
1 KIT TOPICAL
Qty: 1 | Refills: 0 | Status: ACTIVE | COMMUNITY
Start: 2023-07-11 | End: 1900-01-01

## 2023-07-11 NOTE — DISCUSSION/SUMMARY
[de-identified] : (Assessment and Plan)\par \par History, clinical examination and imaging were most consistent with:\par -right shoulder rotator cuff tear (supraspinatus and subscapularis) \par -right coracoid non-displaced fracture\par \par The diagnosis was explained in detail. The potential non-surgical and surgical treatments were reviewed. The relative risks and benefits of each option were considered relative to the patient’s age, activity level, medical history, symptom severity and previously attempted treatments. \par \par The patient was advised to consult with their primary medical provider prior to initiation of any new medications to reduce the risk of adverse effects specific to their long-term home medications and medical history. The risk of gastrointestinal irritation and kidney injury specific to long-term NSAID use was discussed.   \par \par -We discussed the fracture will heal with non surgical management. No immobilization is indicated. Range of motion as tolerated. \par -Patient will proceed with formal PT.\par -Meloxicam as needed for pain. \par -We discussed that rotator cuff repair has high re-tear risk in her age group. The definitive surgical treatment would likely be reverse shoulder arthroplasty once her coracoid fracture is healed. However, we will attempt to avoid surgery. \par -Follow up in 6 weeks. If symptoms persist consider CSI.\par \par \par (MDM) \par \par Problem Complexity\par -Moderate: acute, complicated injury\par \par Risk\par -Moderate: prescription medication\par \par

## 2023-07-11 NOTE — HISTORY OF PRESENT ILLNESS
[de-identified] : (History of Present Illness)  \par \par Patient age in years is  82\par Occupation is  retired\par \par Body part causing symptoms is the right shoulder\par Symptoms began about 3 weeks ago, falling on an outstretched arm\par Location of pain is anterior and posterior \par Quality of pain is  sharp\par Pain score at rest is  8/10\par Pain score during activity is  10/10\par Radicular symptoms are not present\par Prior treatments include rest, tylenol\par

## 2023-07-11 NOTE — DATA REVIEWED
[MRI] : MRI [Right] : of the right [Shoulder] : shoulder [Report was reviewed and noted in the chart] : The report was reviewed and noted in the chart [I reviewed the films/CD and agree] : I reviewed the films/CD and agree [FreeTextEntry1] : full thickness supraspinatus and subscapularis tear, non displaced coracoid base fracture, mild GH chondromalacia, type 2 acromion, no clavicle fracture, AC arthrosis

## 2023-07-11 NOTE — IMAGING
[de-identified] : (Physical Exam: Shoulder)\par \par Laterality is right \par \par Patient is in no acute distress, alert and oriented\par Sensation is grossly intact to light touch in the hand\par Motor function is 5/5 in the hand\par Capillary refill is less than 2 seconds in the fingers\par Lymphadenopathy is not present\par Peripheral edema is not present\par \par Skin is intact \par Swelling is not present \par Atrophy is not present\par Scapular winging is not present\par Deformity of the AC joint is not present\par Deformity of the biceps is not present \par \par Bicipital groove tenderness is present \par AC joint tenderness is not present \par Scapulothoracic tenderness is not present \par Cervical paraspinal tenderness is not present \par \par Active forward elevation is 90\par Passive forward elevation is 165\par External rotation at the side is 60\par Internal rotation behind the back is to the level of sacrum\par \par Forward elevation strength is 4/5\par External rotation strength at the side is 4/5 \par Internal rotation strength at the side is 5-/5 \par Deltoid strength of anterior, posterior and lateral heads is 5/5\par \par Ascencio test is abnormal \par O’Harry’s test is abnormal\par Speed’s test is abnormal\par Cross body adduction test is normal\par Apprehension and relocation is normal\par Sulcus sign is normal\par Belly press test is abnormal\par Spurling’s test is normal\par  [Right] : right shoulder [Outside films reviewed] : Outside films reviewed [There are no fractures, subluxations or dislocations. No significant abnormalities are seen] : There are no fractures, subluxations or dislocations. No significant abnormalities are seen

## 2023-07-25 ENCOUNTER — APPOINTMENT (OUTPATIENT)
Dept: ORTHOPEDIC SURGERY | Facility: CLINIC | Age: 83
End: 2023-07-25
Payer: MEDICARE

## 2023-07-25 DIAGNOSIS — S42.134A: ICD-10-CM

## 2023-07-25 DIAGNOSIS — M75.121 COMPLETE ROTATOR CUFF TEAR OR RUPTURE OF RIGHT SHOULDER, NOT SPECIFIED AS TRAUMATIC: ICD-10-CM

## 2023-07-25 PROCEDURE — 99214 OFFICE O/P EST MOD 30 MIN: CPT | Mod: 25

## 2023-07-25 PROCEDURE — 20610 DRAIN/INJ JOINT/BURSA W/O US: CPT | Mod: RT

## 2023-07-25 NOTE — IMAGING
[Right] : right shoulder [Outside films reviewed] : Outside films reviewed [There are no fractures, subluxations or dislocations. No significant abnormalities are seen] : There are no fractures, subluxations or dislocations. No significant abnormalities are seen [de-identified] : (Physical Exam: Shoulder)\par \par Laterality is right \par \par Patient is in no acute distress, alert and oriented\par Sensation is grossly intact to light touch in the hand\par Motor function is 5/5 in the hand\par Capillary refill is less than 2 seconds in the fingers\par Lymphadenopathy is not present\par Peripheral edema is not present\par \par Skin is intact \par Swelling is not present \par Atrophy is not present\par Scapular winging is not present\par Deformity of the AC joint is not present\par Deformity of the biceps is not present \par \par Bicipital groove tenderness is present \par AC joint tenderness is not present \par Scapulothoracic tenderness is not present \par Cervical paraspinal tenderness is not present \par \par Active forward elevation is 120\par Passive forward elevation is 165\par External rotation at the side is 60\par Internal rotation behind the back is to the level of sacrum\par \par Forward elevation strength is 4/5\par External rotation strength at the side is 4/5 \par Internal rotation strength at the side is 5-/5 \par Deltoid strength of anterior, posterior and lateral heads is 5/5\par \par Ascencio test is abnormal \par O’Harry’s test is abnormal\par Speed’s test is abnormal\par Cross body adduction test is normal\par Apprehension and relocation is normal\par Sulcus sign is normal\par Belly press test is abnormal\par Spurling’s test is normal\par

## 2023-07-25 NOTE — DISCUSSION/SUMMARY
[de-identified] : (Assessment and Plan)\par \par History, clinical examination and imaging were most consistent with:\par -right shoulder rotator cuff tear (supraspinatus and subscapularis) \par -right coracoid non-displaced fracture\par -cervical radiculopathy\par \par The diagnosis was explained in detail. The potential non-surgical and surgical treatments were reviewed. The relative risks and benefits of each option were considered relative to the patient’s age, activity level, medical history, symptom severity and previously attempted treatments. \par \par The patient was advised to consult with their primary medical provider prior to initiation of any new medications to reduce the risk of adverse effects specific to their long-term home medications and medical history. The risk of gastrointestinal irritation and kidney injury specific to long-term NSAID use was discussed.   \par \par -We again discussed the fracture will heal with non surgical management. \par -Patient will proceed with formal PT.\par -Meloxicam as needed for pain. \par -Cortisone injection will be performed for symptom relief. We discussed that cortisone may slow down healing fracture. Patient understands and would like to proceed. \par -We discussed that rotator cuff repair has high re-tear risk in her age group. The definitive surgical treatment would likely be reverse shoulder arthroplasty once her coracoid fracture is healed. However, we will attempt to avoid surgery. \par -Patient advised to follow up with her pain management specialist regarding possible cervical injection for her radicular symptoms. \par -Follow up in 6 weeks, if symptoms persist consider repeat injection versus surgical discussion.\par \par \par (MDM) \par \par Problem Complexity\par -Moderate: acute, complicated injury\par \par Risk\par -Moderate: injection\par \par (Procedure Note)\par \par Injection location was the:\par -right subacromial bursa\par \par Injection contents were: \par 40 mg of kenalog and 5 mL of 1% lidocaine\par \par Procedure Details: \par -Informed consent was obtained. Discussed possible risks of corticosteroid injection including hyperglycemia, infection and skin discoloration. \par -Discussed that due to infection risk, an interval between injection and any future surgery is 6 weeks for arthroscopy and 3 months for arthroplasty.\par -Injection performed using aseptic technique. Hemostasis was confirmed.\par -Procedure was tolerated well with no complications. \par \par

## 2023-07-25 NOTE — HISTORY OF PRESENT ILLNESS
[de-identified] : (History of Present Illness)  \par \par Patient age in years is  82\par Occupation is  retired\par \par Body part causing symptoms is the right shoulder\par Symptoms began about 3 weeks ago, falling on an outstretched arm\par Location of pain is anterior and posterior \par Quality of pain is  sharp\par Pain score at rest is  8/10\par Pain score during activity is  10/10\par Radicular symptoms are  present from her right shoulder to her small finger \par Prior treatments include rest, ice bag\par \par 7/25 f/u:\par Patient starting PT tomorrow. Still having shoulder pain and radicular pain. She is interested in cortisone injection to help her sleep at night.

## 2023-08-10 ENCOUNTER — RX RENEWAL (OUTPATIENT)
Age: 83
End: 2023-08-10

## 2023-08-14 ENCOUNTER — NON-APPOINTMENT (OUTPATIENT)
Age: 83
End: 2023-08-14

## 2023-08-14 ENCOUNTER — APPOINTMENT (OUTPATIENT)
Dept: ORTHOPEDIC SURGERY | Facility: CLINIC | Age: 83
End: 2023-08-14
Payer: MEDICARE

## 2023-08-14 VITALS — HEIGHT: 65 IN | BODY MASS INDEX: 27.49 KG/M2 | WEIGHT: 165 LBS

## 2023-08-14 PROCEDURE — 99214 OFFICE O/P EST MOD 30 MIN: CPT | Mod: 24

## 2023-08-14 RX ORDER — METHYLPREDNISOLONE 4 MG/1
4 TABLET ORAL
Qty: 1 | Refills: 0 | Status: ACTIVE | COMMUNITY
Start: 2023-08-14 | End: 1900-01-01

## 2023-08-14 NOTE — ASSESSMENT
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  Patient advised not to take any NSAIDs while taking the steroids.   Continue HEP  Recommend: - NSAID - Heating pad - Muscle relaxer - Core strengthening exercise - Hamstring stretching exercise Patient is given back rehabilitation exercise book.   Follow up in 2 months.

## 2023-08-14 NOTE — DATA REVIEWED
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
## 2023-09-08 ENCOUNTER — RX RENEWAL (OUTPATIENT)
Age: 83
End: 2023-09-08

## 2023-09-08 RX ORDER — MELOXICAM 7.5 MG/1
7.5 TABLET ORAL DAILY
Qty: 30 | Refills: 0 | Status: ACTIVE | COMMUNITY
Start: 2023-07-11 | End: 1900-01-01

## 2023-09-12 ENCOUNTER — APPOINTMENT (OUTPATIENT)
Dept: ORTHOPEDIC SURGERY | Facility: CLINIC | Age: 83
End: 2023-09-12

## 2023-09-15 ENCOUNTER — APPOINTMENT (OUTPATIENT)
Dept: ORTHOPEDIC SURGERY | Facility: CLINIC | Age: 83
End: 2023-09-15

## 2023-10-16 ENCOUNTER — APPOINTMENT (OUTPATIENT)
Dept: ORTHOPEDIC SURGERY | Facility: CLINIC | Age: 83
End: 2023-10-16
Payer: MEDICARE

## 2023-10-16 DIAGNOSIS — M47.817 SPONDYLOSIS W/OUT MYELOPATHY OR RADICULOPATHY, LUMBOSACRAL REGION: ICD-10-CM

## 2023-10-16 DIAGNOSIS — M51.37 OTHER INTERVERTEBRAL DISC DEGENERATION, LUMBOSACRAL REGION: ICD-10-CM

## 2023-10-16 DIAGNOSIS — M43.16 SPONDYLOLISTHESIS, LUMBAR REGION: ICD-10-CM

## 2023-10-16 DIAGNOSIS — M70.62 TROCHANTERIC BURSITIS, LEFT HIP: ICD-10-CM

## 2023-10-16 DIAGNOSIS — M51.36 OTHER INTERVERTEBRAL DISC DEGENERATION, LUMBAR REGION: ICD-10-CM

## 2023-10-16 DIAGNOSIS — M48.061 SPINAL STENOSIS, LUMBAR REGION WITHOUT NEUROGENIC CLAUDICATION: ICD-10-CM

## 2023-10-16 PROCEDURE — 99214 OFFICE O/P EST MOD 30 MIN: CPT

## 2023-10-19 ENCOUNTER — NON-APPOINTMENT (OUTPATIENT)
Age: 83
End: 2023-10-19

## 2023-11-08 ENCOUNTER — APPOINTMENT (OUTPATIENT)
Dept: OPHTHALMOLOGY | Facility: CLINIC | Age: 83
End: 2023-11-08
Payer: MEDICARE

## 2023-11-08 ENCOUNTER — NON-APPOINTMENT (OUTPATIENT)
Age: 83
End: 2023-11-08

## 2023-11-08 PROCEDURE — 92014 COMPRE OPH EXAM EST PT 1/>: CPT

## 2023-11-08 PROCEDURE — 92134 CPTRZ OPH DX IMG PST SGM RTA: CPT

## 2023-11-20 RX ORDER — TOLTERODINE TARTRATE 2 MG/1
2 CAPSULE, EXTENDED RELEASE ORAL
Qty: 30 | Refills: 0 | Status: ACTIVE | COMMUNITY
Start: 2023-06-06 | End: 1900-01-01

## 2023-12-07 ENCOUNTER — APPOINTMENT (OUTPATIENT)
Dept: UROGYNECOLOGY | Facility: CLINIC | Age: 83
End: 2023-12-07
Payer: MEDICARE

## 2023-12-07 DIAGNOSIS — R35.1 NOCTURIA: ICD-10-CM

## 2023-12-07 DIAGNOSIS — N39.41 URGE INCONTINENCE: ICD-10-CM

## 2023-12-07 DIAGNOSIS — N32.81 OVERACTIVE BLADDER: ICD-10-CM

## 2023-12-07 DIAGNOSIS — R35.0 FREQUENCY OF MICTURITION: ICD-10-CM

## 2023-12-07 DIAGNOSIS — R39.15 URGENCY OF URINATION: ICD-10-CM

## 2023-12-07 PROCEDURE — 99214 OFFICE O/P EST MOD 30 MIN: CPT | Mod: 25

## 2023-12-07 PROCEDURE — 51701 INSERT BLADDER CATHETER: CPT | Mod: 59

## 2023-12-07 RX ORDER — MIRABEGRON 25 MG/1
25 TABLET, FILM COATED, EXTENDED RELEASE ORAL
Qty: 30 | Refills: 1 | Status: ACTIVE | COMMUNITY
Start: 2023-12-07 | End: 1900-01-01

## 2023-12-08 LAB
APPEARANCE: ABNORMAL
BACTERIA: ABNORMAL /HPF
BILIRUBIN URINE: NEGATIVE
BLOOD URINE: ABNORMAL
CAST: 0 /LPF
COLOR: YELLOW
EPITHELIAL CELLS: 0 /HPF
GLUCOSE QUALITATIVE U: NEGATIVE MG/DL
KETONES URINE: NEGATIVE MG/DL
LEUKOCYTE ESTERASE URINE: ABNORMAL
MICROSCOPIC-UA: NORMAL
NITRITE URINE: POSITIVE
PH URINE: 6.5
PROTEIN URINE: NEGATIVE MG/DL
RED BLOOD CELLS URINE: 1 /HPF
REVIEW: NORMAL
SPECIFIC GRAVITY URINE: 1.01
UROBILINOGEN URINE: 0.2 MG/DL
WHITE BLOOD CELLS URINE: 19 /HPF

## 2023-12-12 LAB — BACTERIA UR CULT: ABNORMAL

## 2023-12-12 RX ORDER — CEPHALEXIN 250 MG/1
250 CAPSULE ORAL
Qty: 14 | Refills: 0 | Status: ACTIVE | COMMUNITY
Start: 2023-12-12 | End: 1900-01-01

## 2023-12-15 ENCOUNTER — APPOINTMENT (OUTPATIENT)
Dept: ORTHOPEDIC SURGERY | Facility: CLINIC | Age: 83
End: 2023-12-15

## 2023-12-18 ENCOUNTER — APPOINTMENT (OUTPATIENT)
Dept: ORTHOPEDIC SURGERY | Facility: CLINIC | Age: 83
End: 2023-12-18

## 2023-12-27 RX ORDER — CEPHALEXIN 500 MG/1
500 CAPSULE ORAL
Qty: 14 | Refills: 0 | Status: ACTIVE | COMMUNITY
Start: 2023-12-27 | End: 1900-01-01

## 2024-01-08 RX ORDER — ESTRADIOL 0.1 MG/G
0.1 CREAM VAGINAL
Qty: 1 | Refills: 3 | Status: ACTIVE | COMMUNITY
Start: 2022-10-06 | End: 1900-01-01

## 2024-01-19 RX ORDER — VIBEGRON 75 MG/1
75 TABLET, FILM COATED ORAL
Qty: 30 | Refills: 3 | Status: ACTIVE | COMMUNITY
Start: 2024-01-19 | End: 1900-01-01

## 2024-05-02 ENCOUNTER — NON-APPOINTMENT (OUTPATIENT)
Age: 84
End: 2024-05-02

## 2024-05-02 ENCOUNTER — APPOINTMENT (OUTPATIENT)
Dept: OPHTHALMOLOGY | Facility: CLINIC | Age: 84
End: 2024-05-02
Payer: MEDICARE

## 2024-05-02 PROCEDURE — 92014 COMPRE OPH EXAM EST PT 1/>: CPT

## 2024-05-02 PROCEDURE — 92134 CPTRZ OPH DX IMG PST SGM RTA: CPT

## 2024-06-21 ENCOUNTER — APPOINTMENT (OUTPATIENT)
Dept: RADIOLOGY | Facility: CLINIC | Age: 84
End: 2024-06-21
Payer: MEDICARE

## 2024-06-21 PROCEDURE — 72070 X-RAY EXAM THORAC SPINE 2VWS: CPT

## 2024-08-01 ENCOUNTER — NON-APPOINTMENT (OUTPATIENT)
Age: 84
End: 2024-08-01

## 2024-08-02 RX ORDER — PHENAZOPYRIDINE 200 MG/1
200 TABLET, FILM COATED ORAL 3 TIMES DAILY
Qty: 15 | Refills: 0 | Status: ACTIVE | COMMUNITY
Start: 2024-08-02 | End: 1900-01-01

## 2024-08-15 ENCOUNTER — APPOINTMENT (OUTPATIENT)
Dept: OPHTHALMOLOGY | Facility: CLINIC | Age: 84
End: 2024-08-15

## 2024-08-22 RX ORDER — CEPHALEXIN 500 MG/1
500 CAPSULE ORAL
Qty: 14 | Refills: 0 | Status: ACTIVE | COMMUNITY
Start: 2024-08-22 | End: 1900-01-01

## 2024-08-29 ENCOUNTER — APPOINTMENT (OUTPATIENT)
Dept: UROGYNECOLOGY | Facility: CLINIC | Age: 84
End: 2024-08-29

## 2024-08-29 DIAGNOSIS — R39.15 URGENCY OF URINATION: ICD-10-CM

## 2024-08-29 DIAGNOSIS — N95.2 POSTMENOPAUSAL ATROPHIC VAGINITIS: ICD-10-CM

## 2024-08-29 DIAGNOSIS — R32 UNSPECIFIED URINARY INCONTINENCE: ICD-10-CM

## 2024-08-29 DIAGNOSIS — R35.0 FREQUENCY OF MICTURITION: ICD-10-CM

## 2024-08-29 DIAGNOSIS — N39.0 URINARY TRACT INFECTION, SITE NOT SPECIFIED: ICD-10-CM

## 2024-08-29 DIAGNOSIS — R30.0 DYSURIA: ICD-10-CM

## 2024-08-29 DIAGNOSIS — R35.1 NOCTURIA: ICD-10-CM

## 2024-08-29 DIAGNOSIS — N32.81 OVERACTIVE BLADDER: ICD-10-CM

## 2024-08-29 PROCEDURE — 99214 OFFICE O/P EST MOD 30 MIN: CPT

## 2024-08-29 NOTE — ASSESSMENT
[FreeTextEntry1] : Cynthia w/c/o of recurrent UTI and overactive bladder is not able to take OAB meds due to side effects and high copay. She is also struggling with recurrent UTI. Offered her third line option for OAB.

## 2024-08-29 NOTE — PHYSICAL EXAM
[FreeTextEntry1] : General: Not in acute distress, alert and oriented x3.Neck: Supple. No lymphadenopathy.  Abdomen: Soft, nontender, and nondistended. No obvious hepatosplenomegaly. No obvious hernias. Pelvic Exam: Normal external female genitalia. Saddle sensory exam S2 to S4 is intact. Perineal reflexes not visualized. Urethra is well supported without prolapse, exudates, or lesions. Cough stress test is negative.  Pale and mildly atrophic-appearing vaginal epithelium.  All vaginal compartments are well supported. No anterior and posterior vaginal wall prolapse noted.

## 2024-08-29 NOTE — DISCUSSION/SUMMARY
[FreeTextEntry1] : [] Pt is currently on Keflex 500mg. Not sending urine out today. Provided her scripts and sterile cups to drop off urine in two days to do NATHANIEL. If her culture still comes back positive will treat her with antibiotics as per sensitivities. [] Discussed third line treatment options for overactive bladder at great length. Discussed PTNS, Bladder Botox and SNM Axonics procedure and its pros and cons. Shared brochure to read about it. Pt is more inclined towards bladder Botox. She wants to think about and also wants to discuss with her pain doctor. She will inform us when she makes up her mind.  [] Follow up in six months or as needed. [] Instructed to call with any questions or concerns and she verbalizes understanding.

## 2024-08-30 LAB
APPEARANCE: CLEAR
BACTERIA: NEGATIVE /HPF
BILIRUBIN URINE: NEGATIVE
BLOOD URINE: NEGATIVE
CAST: 0 /LPF
COLOR: YELLOW
EPITHELIAL CELLS: 4 /HPF
GLUCOSE QUALITATIVE U: NEGATIVE MG/DL
KETONES URINE: NEGATIVE MG/DL
LEUKOCYTE ESTERASE URINE: NEGATIVE
MICROSCOPIC-UA: NORMAL
NITRITE URINE: NEGATIVE
PH URINE: 6
PROTEIN URINE: NEGATIVE MG/DL
RED BLOOD CELLS URINE: 2 /HPF
SPECIFIC GRAVITY URINE: 1.01
UROBILINOGEN URINE: 0.2 MG/DL
WHITE BLOOD CELLS URINE: 0 /HPF

## 2024-09-03 LAB — BACTERIA UR CULT: NORMAL

## 2024-09-09 RX ORDER — CEPHALEXIN 500 MG/1
500 CAPSULE ORAL
Qty: 14 | Refills: 0 | Status: ACTIVE | COMMUNITY
Start: 2024-09-09 | End: 1900-01-01

## 2024-09-09 RX ORDER — NITROFURANTOIN (MONOHYDRATE/MACROCRYSTALS) 25; 75 MG/1; MG/1
100 CAPSULE ORAL
Qty: 90 | Refills: 0 | Status: ACTIVE | COMMUNITY
Start: 2024-09-09 | End: 1900-01-01

## 2025-01-16 ENCOUNTER — APPOINTMENT (OUTPATIENT)
Dept: OPHTHALMOLOGY | Facility: CLINIC | Age: 85
End: 2025-01-16

## 2025-01-16 ENCOUNTER — LABORATORY RESULT (OUTPATIENT)
Age: 85
End: 2025-01-16

## 2025-01-21 RX ORDER — CEPHALEXIN 500 MG/1
500 CAPSULE ORAL
Qty: 14 | Refills: 0 | Status: ACTIVE | COMMUNITY
Start: 2025-01-21 | End: 1900-01-01

## 2025-02-27 LAB
APPEARANCE: ABNORMAL
BACTERIA: ABNORMAL /HPF
BILIRUBIN URINE: NEGATIVE
BLOOD URINE: ABNORMAL
COLOR: YELLOW
GLUCOSE QUALITATIVE U: NEGATIVE MG/DL
HYALINE CASTS: 2
KETONES URINE: NEGATIVE MG/DL
LEUKOCYTE ESTERASE URINE: ABNORMAL
MICROSCOPIC-UA: NORMAL
NITRITE URINE: POSITIVE
PH URINE: 5.5
PROTEIN URINE: NORMAL MG/DL
RED BLOOD CELLS URINE: 5 /HPF
SPECIFIC GRAVITY URINE: 1.02
SQUAMOUS EPITHELIAL CELLS: 5
UROBILINOGEN URINE: 0.2 MG/DL
WHITE BLOOD CELLS URINE: 500 /HPF

## 2025-02-28 LAB — BACTERIA UR CULT: ABNORMAL

## 2025-02-28 RX ORDER — AMOXICILLIN AND CLAVULANATE POTASSIUM 500; 125 MG/1; MG/1
500-125 TABLET, FILM COATED ORAL
Qty: 10 | Refills: 0 | Status: ACTIVE | COMMUNITY
Start: 2025-02-28 | End: 1900-01-01

## 2025-03-07 ENCOUNTER — APPOINTMENT (OUTPATIENT)
Dept: UROGYNECOLOGY | Facility: CLINIC | Age: 85
End: 2025-03-07

## 2025-03-07 VITALS
WEIGHT: 165 LBS | BODY MASS INDEX: 27.49 KG/M2 | DIASTOLIC BLOOD PRESSURE: 84 MMHG | HEIGHT: 65 IN | SYSTOLIC BLOOD PRESSURE: 146 MMHG

## 2025-03-07 DIAGNOSIS — N39.41 URGE INCONTINENCE: ICD-10-CM

## 2025-03-07 DIAGNOSIS — R35.0 FREQUENCY OF MICTURITION: ICD-10-CM

## 2025-03-07 DIAGNOSIS — N95.2 POSTMENOPAUSAL ATROPHIC VAGINITIS: ICD-10-CM

## 2025-03-07 DIAGNOSIS — N39.0 URINARY TRACT INFECTION, SITE NOT SPECIFIED: ICD-10-CM

## 2025-03-07 DIAGNOSIS — R35.1 NOCTURIA: ICD-10-CM

## 2025-03-07 DIAGNOSIS — R39.15 URGENCY OF URINATION: ICD-10-CM

## 2025-03-07 LAB
BILIRUB UR QL STRIP: NEGATIVE
CLARITY UR: CLEAR
COLLECTION METHOD: NORMAL
GLUCOSE UR-MCNC: NEGATIVE
HCG UR QL: 0.2 EU/DL
HGB UR QL STRIP.AUTO: NORMAL
KETONES UR-MCNC: NEGATIVE
LEUKOCYTE ESTERASE UR QL STRIP: NEGATIVE
NITRITE UR QL STRIP: NEGATIVE
PH UR STRIP: 6
PROT UR STRIP-MCNC: NEGATIVE
SP GR UR STRIP: 1

## 2025-03-07 PROCEDURE — 99214 OFFICE O/P EST MOD 30 MIN: CPT

## 2025-03-07 PROCEDURE — 81003 URINALYSIS AUTO W/O SCOPE: CPT | Mod: QW

## 2025-04-10 LAB
APPEARANCE: CLEAR
BACTERIA: ABNORMAL /HPF
BILIRUBIN URINE: NEGATIVE
BLOOD URINE: NEGATIVE
CAST: 0 /LPF
COLOR: YELLOW
EPITHELIAL CELLS: 1 /HPF
GLUCOSE QUALITATIVE U: NEGATIVE MG/DL
KETONES URINE: NEGATIVE MG/DL
LEUKOCYTE ESTERASE URINE: ABNORMAL
MICROSCOPIC-UA: NORMAL
NITRITE URINE: NEGATIVE
PH URINE: 7
PROTEIN URINE: NEGATIVE MG/DL
RED BLOOD CELLS URINE: 1 /HPF
SPECIFIC GRAVITY URINE: 1.01
UROBILINOGEN URINE: 0.2 MG/DL
WHITE BLOOD CELLS URINE: 8 /HPF

## 2025-04-11 RX ORDER — CEPHALEXIN 500 MG/1
500 CAPSULE ORAL
Qty: 14 | Refills: 0 | Status: ACTIVE | COMMUNITY
Start: 2025-04-11 | End: 1900-01-01

## 2025-04-21 DIAGNOSIS — N39.0 URINARY TRACT INFECTION, SITE NOT SPECIFIED: ICD-10-CM

## 2025-04-21 RX ORDER — CEPHALEXIN 250 MG/1
250 CAPSULE ORAL
Qty: 90 | Refills: 0 | Status: ACTIVE | COMMUNITY
Start: 2025-04-21 | End: 1900-01-01

## 2025-08-07 LAB
APPEARANCE: CLEAR
BACTERIA: ABNORMAL /HPF
BILIRUBIN URINE: NEGATIVE
BLOOD URINE: ABNORMAL
CAST: 0 /LPF
COLOR: YELLOW
EPITHELIAL CELLS: 4 /HPF
GLUCOSE QUALITATIVE U: NEGATIVE MG/DL
KETONES URINE: NEGATIVE MG/DL
LEUKOCYTE ESTERASE URINE: ABNORMAL
MICROSCOPIC-UA: NORMAL
NITRITE URINE: NEGATIVE
PH URINE: 6.5
PROTEIN URINE: NEGATIVE MG/DL
RED BLOOD CELLS URINE: 0 /HPF
SPECIFIC GRAVITY URINE: 1.01
UROBILINOGEN URINE: 0.2 MG/DL
WHITE BLOOD CELLS URINE: 66 /HPF

## 2025-08-11 RX ORDER — ESTRADIOL 0.1 MG/G
0.1 CREAM VAGINAL
Qty: 1 | Refills: 4 | Status: ACTIVE | COMMUNITY
Start: 2025-08-11 | End: 1900-01-01

## 2025-08-11 RX ORDER — CEFPODOXIME PROXETIL 200 MG/1
200 TABLET, FILM COATED ORAL
Qty: 14 | Refills: 0 | Status: ACTIVE | COMMUNITY
Start: 2025-08-11 | End: 1900-01-01

## 2025-08-22 RX ORDER — METHENAMINE HIPPURATE 1 G/1
1 TABLET ORAL
Qty: 30 | Refills: 0 | Status: ACTIVE | COMMUNITY
Start: 2025-08-22 | End: 1900-01-01